# Patient Record
Sex: FEMALE | Race: WHITE | Employment: FULL TIME | URBAN - METROPOLITAN AREA
[De-identification: names, ages, dates, MRNs, and addresses within clinical notes are randomized per-mention and may not be internally consistent; named-entity substitution may affect disease eponyms.]

---

## 2018-10-05 ENCOUNTER — HOSPITAL ENCOUNTER (OUTPATIENT)
Dept: LAB | Age: 58
Discharge: HOME | End: 2018-10-05

## 2018-10-05 PROCEDURE — 88312 SPECIAL STAINS GROUP 1: CPT

## 2018-10-05 PROCEDURE — 88305 TISSUE EXAM BY PATHOLOGIST: CPT

## 2018-10-18 ENCOUNTER — HOSPITAL ENCOUNTER (OUTPATIENT)
Dept: LAB | Age: 58
Discharge: HOME | End: 2018-10-18

## 2018-10-18 PROCEDURE — 88305 TISSUE EXAM BY PATHOLOGIST: CPT

## 2018-10-18 PROCEDURE — 88312 SPECIAL STAINS GROUP 1: CPT

## 2018-11-12 ENCOUNTER — HOSPITAL ENCOUNTER (INPATIENT)
Age: 58
LOS: 3 days | Discharge: HOME | DRG: 392 | End: 2018-11-15
Attending: SURGERY | Admitting: SURGERY
Payer: COMMERCIAL

## 2018-11-12 ENCOUNTER — HOSPITAL ENCOUNTER (OUTPATIENT)
Dept: CT IMAGING | Age: 58
Discharge: HOME | DRG: 392 | End: 2018-11-12
Attending: INTERNAL MEDICINE
Payer: COMMERCIAL

## 2018-11-12 DIAGNOSIS — K57.80 DIVERTICULITIS OF INTESTINE WITH ABSCESS WITHOUT BLEEDING, UNSPECIFIED PART OF INTESTINAL TRACT: Primary | ICD-10-CM

## 2018-11-12 DIAGNOSIS — K57.90 DIVERTICULOSIS OF INTESTINE WITHOUT BLEEDING, UNSPECIFIED INTESTINAL TRACT LOCATION: ICD-10-CM

## 2018-11-12 DIAGNOSIS — R10.30 LOWER ABDOMINAL PAIN: ICD-10-CM

## 2018-11-12 LAB
ABO + RH BLD: NORMAL
ALBUMIN SERPL-MCNC: 2.8 G/DL (ref 3.5–5)
ALBUMIN/GLOB SERPL: 0.5 {RATIO} (ref 1.2–3.5)
ALP SERPL-CCNC: 141 U/L (ref 50–136)
ALT SERPL-CCNC: 24 U/L (ref 12–65)
ANION GAP SERPL CALC-SCNC: 9 MMOL/L (ref 7–16)
AST SERPL-CCNC: 19 U/L (ref 15–37)
BASOPHILS # BLD: 0.1 K/UL (ref 0–0.2)
BASOPHILS NFR BLD: 0 % (ref 0–2)
BILIRUB SERPL-MCNC: 0.3 MG/DL (ref 0.2–1.1)
BLOOD GROUP ANTIBODIES SERPL: NORMAL
BUN SERPL-MCNC: 11 MG/DL (ref 6–23)
CALCIUM SERPL-MCNC: 9.3 MG/DL (ref 8.3–10.4)
CHLORIDE SERPL-SCNC: 100 MMOL/L (ref 98–107)
CO2 SERPL-SCNC: 26 MMOL/L (ref 21–32)
CREAT SERPL-MCNC: 0.98 MG/DL (ref 0.6–1)
DIFFERENTIAL METHOD BLD: ABNORMAL
EOSINOPHIL # BLD: 0.2 K/UL (ref 0–0.8)
EOSINOPHIL NFR BLD: 1 % (ref 0.5–7.8)
ERYTHROCYTE [DISTWIDTH] IN BLOOD BY AUTOMATED COUNT: 12.7 %
GLOBULIN SER CALC-MCNC: 5.5 G/DL (ref 2.3–3.5)
GLUCOSE SERPL-MCNC: 94 MG/DL (ref 65–100)
HCT VFR BLD AUTO: 36.6 % (ref 35.8–46.3)
HGB BLD-MCNC: 11.7 G/DL (ref 11.7–15.4)
IMM GRANULOCYTES # BLD: 0.1 K/UL (ref 0–0.5)
IMM GRANULOCYTES NFR BLD AUTO: 1 % (ref 0–5)
LYMPHOCYTES # BLD: 2.4 K/UL (ref 0.5–4.6)
LYMPHOCYTES NFR BLD: 16 % (ref 13–44)
MCH RBC QN AUTO: 25.7 PG (ref 26.1–32.9)
MCHC RBC AUTO-ENTMCNC: 32 G/DL (ref 31.4–35)
MCV RBC AUTO: 80.3 FL (ref 79.6–97.8)
MONOCYTES # BLD: 1.2 K/UL (ref 0.1–1.3)
MONOCYTES NFR BLD: 8 % (ref 4–12)
NEUTS SEG # BLD: 10.9 K/UL (ref 1.7–8.2)
NEUTS SEG NFR BLD: 74 % (ref 43–78)
NRBC # BLD: 0 K/UL (ref 0–0.2)
PLATELET # BLD AUTO: 472 K/UL (ref 150–450)
PMV BLD AUTO: 9.8 FL (ref 9.4–12.3)
POTASSIUM SERPL-SCNC: 4 MMOL/L (ref 3.5–5.1)
PROT SERPL-MCNC: 8.3 G/DL (ref 6.3–8.2)
RBC # BLD AUTO: 4.56 M/UL (ref 4.05–5.2)
SODIUM SERPL-SCNC: 135 MMOL/L (ref 136–145)
SPECIMEN EXP DATE BLD: NORMAL
WBC # BLD AUTO: 14.7 K/UL (ref 4.3–11.1)

## 2018-11-12 PROCEDURE — 87153 DNA/RNA SEQUENCING: CPT

## 2018-11-12 PROCEDURE — 74011000258 HC RX REV CODE- 258: Performed by: SURGERY

## 2018-11-12 PROCEDURE — 77030032490 HC SLV COMPR SCD KNE COVD -B

## 2018-11-12 PROCEDURE — 86900 BLOOD TYPING SEROLOGIC ABO: CPT

## 2018-11-12 PROCEDURE — 74011250636 HC RX REV CODE- 250/636: Performed by: SURGERY

## 2018-11-12 PROCEDURE — 85025 COMPLETE CBC W/AUTO DIFF WBC: CPT

## 2018-11-12 PROCEDURE — 36415 COLL VENOUS BLD VENIPUNCTURE: CPT

## 2018-11-12 PROCEDURE — 87076 CULTURE ANAEROBE IDENT EACH: CPT

## 2018-11-12 PROCEDURE — 74011636320 HC RX REV CODE- 636/320: Performed by: INTERNAL MEDICINE

## 2018-11-12 PROCEDURE — 75810000275 HC EMERGENCY DEPT VISIT NO LEVEL OF CARE: Performed by: EMERGENCY MEDICINE

## 2018-11-12 PROCEDURE — 80053 COMPREHEN METABOLIC PANEL: CPT

## 2018-11-12 PROCEDURE — 65270000029 HC RM PRIVATE

## 2018-11-12 PROCEDURE — 77030020257 HC SOL INJ SOD CL 0.45% 1000ML BG

## 2018-11-12 PROCEDURE — 74011000258 HC RX REV CODE- 258: Performed by: INTERNAL MEDICINE

## 2018-11-12 PROCEDURE — 74177 CT ABD & PELVIS W/CONTRAST: CPT

## 2018-11-12 RX ORDER — POTASSIUM CHLORIDE AND SODIUM CHLORIDE 450; 150 MG/100ML; MG/100ML
INJECTION, SOLUTION INTRAVENOUS CONTINUOUS
Status: DISCONTINUED | OUTPATIENT
Start: 2018-11-12 | End: 2018-11-14

## 2018-11-12 RX ORDER — HYDROMORPHONE HYDROCHLORIDE 1 MG/ML
0.5 INJECTION, SOLUTION INTRAMUSCULAR; INTRAVENOUS; SUBCUTANEOUS
Status: DISCONTINUED | OUTPATIENT
Start: 2018-11-12 | End: 2018-11-15 | Stop reason: HOSPADM

## 2018-11-12 RX ORDER — ONDANSETRON 2 MG/ML
4 INJECTION INTRAMUSCULAR; INTRAVENOUS
Status: DISCONTINUED | OUTPATIENT
Start: 2018-11-12 | End: 2018-11-15 | Stop reason: HOSPADM

## 2018-11-12 RX ORDER — SODIUM CHLORIDE 0.9 % (FLUSH) 0.9 %
10 SYRINGE (ML) INJECTION
Status: COMPLETED | OUTPATIENT
Start: 2018-11-12 | End: 2018-11-12

## 2018-11-12 RX ADMIN — PIPERACILLIN SODIUM,TAZOBACTAM SODIUM 3.38 G: 3; .375 INJECTION, POWDER, FOR SOLUTION INTRAVENOUS at 23:00

## 2018-11-12 RX ADMIN — IOPAMIDOL 100 ML: 755 INJECTION, SOLUTION INTRAVENOUS at 14:49

## 2018-11-12 RX ADMIN — SODIUM CHLORIDE 100 ML: 900 INJECTION, SOLUTION INTRAVENOUS at 14:49

## 2018-11-12 RX ADMIN — DIATRIZOATE MEGLUMINE AND DIATRIZOATE SODIUM 15 ML: 660; 100 LIQUID ORAL; RECTAL at 14:49

## 2018-11-12 RX ADMIN — SODIUM CHLORIDE AND POTASSIUM CHLORIDE: 4.5; 1.49 INJECTION, SOLUTION INTRAVENOUS at 23:00

## 2018-11-12 RX ADMIN — Medication 10 ML: at 14:49

## 2018-11-12 NOTE — ED TRIAGE NOTES
Pt sent here for abnormal CT scan, pericolonic abscess. gen surgery paged. Continual abd pain for week

## 2018-11-12 NOTE — PROGRESS NOTES
Just spoke to [facility] and determined that patient is being sent to ER. This is what I would have recommended as well.

## 2018-11-12 NOTE — H&P
[facility] [address], [address] [facility] 
[phone] History and Physical/Surgical Consult Eli Vaughan    Admit date: 2018 MRN: [id number]     : 1960     Age: 60 y.o.       
 
2018 5:15 PM 
 
Subjective/HPI:  
This patient is a 60 y.o. sent to ED by her PCP. Pt reports about one week of worsening abdominal pain, mainly in LLQ. She had a CT scan today that is below. C/w perforated diverticulitis and contained abscess. Pt has had diverticulitis in past requiring abx. Normal colonoscopy by Dr Lori Reyes recently Denies fevers or chills. Has nausea but no  Vomiting. Has been constipated over last week. No bleeding Review of Systems A comprehensive review of systems was negative except for: Constitutional: positive for fatigue, malaise and anorexia Gastrointestinal: positive for nausea, constipation and abdominal pain Past Medical History:  
Diagnosis Date  Diaphragmatic hernia without mention of obstruction or gangrene 2015  Diverticulosis  Effusion of ankle and foot joint  Encounter for long-term (current) use of other medications  Helminth infection, unspecified  Morbid obesity ([address] 2015  Nontoxic uninodular goiter 2015  Other specified erythematous condition(695.89)  Overweight(278.02) Past Surgical History:  
Procedure Laterality Date 1 [address] No Known Allergies Social History Tobacco Use  Smoking status: Never Smoker  Smokeless tobacco: Never Used Substance Use Topics  Alcohol use: Yes Comment: rarely Social History Social History Narrative  Not on file Family History Problem Relation Age of Onset  Cancer Mother  Heart Disease Mother  Stroke Father  Diabetes Father  Cancer Brother  Cancer Maternal Grandmother   
     colon cancer Prior to Admission Medications Prescriptions Last Dose Informant Patient Reported? Taking?  
amoxicillin-clavulanate (AUGMENTIN) 875-125 mg per tablet   No No  
Sig: Take 1 Tab by mouth every twelve (12) hours. ibuprofen 200 mg cap   Yes No  
Sig: Take  by mouth as needed. raNITIdine (ZANTAC) 150 mg tablet   No No  
Sig: Take 1 Tab by mouth two (2) times a day. Facility-Administered Medications: None No current facility-administered medications for this encounter. Current Outpatient Medications Medication Sig  
 amoxicillin-clavulanate (AUGMENTIN) 875-125 mg per tablet Take 1 Tab by mouth every twelve (12) hours.  raNITIdine (ZANTAC) 150 mg tablet Take 1 Tab by mouth two (2) times a day.  ibuprofen 200 mg cap Take  by mouth as needed. Objective:  
 
Vitals:  
 11/12/18 1559 BP: 138/89 Pulse: (!) 116 Resp: 18 Temp: 98 °F (36.7 °C) SpO2: 98% Weight: 178 lb (80.7 kg) Height: 4' 10\" (1.473 m) No intake/output data recorded. No intake/output data recorded. Physical Exam:  
Gen- the patient is well developed and in no acute distress HEENT- PERRL, EOMI, no scleral icterus 
     nose without alar flaring or epistaxis 
                oral muscosa moist without cyanosis Neck- no JVD or retractions Lungs- resp even/unlab Heart- RRR Abd- soft, nondistended. Tender to palpation in LLQ with voluntary guarding. No rebound. No mass or hernia found Ext- warm without cyanosis. There is no lower leg edema. Skin- no jaundice or rashes Neuro- alert and oriented x 3. No gross sensorimotor deficits are present. Data Review Recent Labs 11/12/18 
1605 WBC 14.7* HGB 11.7 HCT 36.6 * Recent Labs 11/12/18 
1605 * K 4.0  
 CO2 26 GLU 94 BUN 11  
CREA 0.98 Assessment:  
 
Hospital Problems  Date Reviewed: 11/9/2018 Codes Class Noted POA  Diverticulitis of intestine with abscess without bleeding ICD-10-CM: K57.80 ICD-9-CM: 562.11, 569.5  11/12/2018 Unknown Plan:  
 
Sigmoid diverticulitis with contained perforation and abscess Pt admitted for 1. IV abx 2. Consult IR in am for consideration of Ct guided drainage of abscess 3. NPO after midnight Marletta Goodpasture, MD

## 2018-11-13 ENCOUNTER — APPOINTMENT (OUTPATIENT)
Dept: CT IMAGING | Age: 58
DRG: 392 | End: 2018-11-13
Attending: SURGERY
Payer: COMMERCIAL

## 2018-11-13 LAB
ANION GAP SERPL CALC-SCNC: 7 MMOL/L (ref 7–16)
BASOPHILS # BLD: 0.1 K/UL (ref 0–0.2)
BASOPHILS NFR BLD: 1 % (ref 0–2)
BUN SERPL-MCNC: 10 MG/DL (ref 6–23)
CALCIUM SERPL-MCNC: 9 MG/DL (ref 8.3–10.4)
CHLORIDE SERPL-SCNC: 102 MMOL/L (ref 98–107)
CO2 SERPL-SCNC: 28 MMOL/L (ref 21–32)
CREAT SERPL-MCNC: 0.98 MG/DL (ref 0.6–1)
DIFFERENTIAL METHOD BLD: ABNORMAL
EOSINOPHIL # BLD: 0.3 K/UL (ref 0–0.8)
EOSINOPHIL NFR BLD: 3 % (ref 0.5–7.8)
ERYTHROCYTE [DISTWIDTH] IN BLOOD BY AUTOMATED COUNT: 12.6 %
GLUCOSE SERPL-MCNC: 88 MG/DL (ref 65–100)
HCT VFR BLD AUTO: 35 % (ref 35.8–46.3)
HGB BLD-MCNC: 11.1 G/DL (ref 11.7–15.4)
IMM GRANULOCYTES # BLD: 0.1 K/UL (ref 0–0.5)
IMM GRANULOCYTES NFR BLD AUTO: 1 % (ref 0–5)
LYMPHOCYTES # BLD: 2.3 K/UL (ref 0.5–4.6)
LYMPHOCYTES NFR BLD: 23 % (ref 13–44)
MCH RBC QN AUTO: 25.6 PG (ref 26.1–32.9)
MCHC RBC AUTO-ENTMCNC: 31.7 G/DL (ref 31.4–35)
MCV RBC AUTO: 80.8 FL (ref 79.6–97.8)
MONOCYTES # BLD: 0.8 K/UL (ref 0.1–1.3)
MONOCYTES NFR BLD: 9 % (ref 4–12)
NEUTS SEG # BLD: 6.4 K/UL (ref 1.7–8.2)
NEUTS SEG NFR BLD: 65 % (ref 43–78)
NRBC # BLD: 0 K/UL (ref 0–0.2)
PLATELET # BLD AUTO: 441 K/UL (ref 150–450)
PMV BLD AUTO: 9.8 FL (ref 9.4–12.3)
POTASSIUM SERPL-SCNC: 4.2 MMOL/L (ref 3.5–5.1)
RBC # BLD AUTO: 4.33 M/UL (ref 4.05–5.2)
SODIUM SERPL-SCNC: 137 MMOL/L (ref 136–145)
WBC # BLD AUTO: 9.9 K/UL (ref 4.3–11.1)

## 2018-11-13 PROCEDURE — 74011250636 HC RX REV CODE- 250/636: Performed by: RADIOLOGY

## 2018-11-13 PROCEDURE — 85025 COMPLETE CBC W/AUTO DIFF WBC: CPT

## 2018-11-13 PROCEDURE — 74011000250 HC RX REV CODE- 250: Performed by: RADIOLOGY

## 2018-11-13 PROCEDURE — 87075 CULTR BACTERIA EXCEPT BLOOD: CPT

## 2018-11-13 PROCEDURE — 87205 SMEAR GRAM STAIN: CPT

## 2018-11-13 PROCEDURE — 65270000029 HC RM PRIVATE

## 2018-11-13 PROCEDURE — C9113 INJ PANTOPRAZOLE SODIUM, VIA: HCPCS | Performed by: SURGERY

## 2018-11-13 PROCEDURE — 87077 CULTURE AEROBIC IDENTIFY: CPT

## 2018-11-13 PROCEDURE — 74011250637 HC RX REV CODE- 250/637: Performed by: SURGERY

## 2018-11-13 PROCEDURE — 99152 MOD SED SAME PHYS/QHP 5/>YRS: CPT

## 2018-11-13 PROCEDURE — 80048 BASIC METABOLIC PNL TOTAL CA: CPT

## 2018-11-13 PROCEDURE — C1729 CATH, DRAINAGE: HCPCS

## 2018-11-13 PROCEDURE — 87186 SC STD MICRODIL/AGAR DIL: CPT

## 2018-11-13 PROCEDURE — 74011000258 HC RX REV CODE- 258: Performed by: SURGERY

## 2018-11-13 PROCEDURE — 74011000250 HC RX REV CODE- 250: Performed by: SURGERY

## 2018-11-13 PROCEDURE — 74011250636 HC RX REV CODE- 250/636

## 2018-11-13 PROCEDURE — 36415 COLL VENOUS BLD VENIPUNCTURE: CPT

## 2018-11-13 PROCEDURE — 74011250636 HC RX REV CODE- 250/636: Performed by: SURGERY

## 2018-11-13 PROCEDURE — 77030020257 HC SOL INJ SOD CL 0.45% 1000ML BG

## 2018-11-13 PROCEDURE — 99153 MOD SED SAME PHYS/QHP EA: CPT

## 2018-11-13 PROCEDURE — 0W9J30Z DRAINAGE OF PELVIC CAVITY WITH DRAINAGE DEVICE, PERCUTANEOUS APPROACH: ICD-10-PCS | Performed by: RADIOLOGY

## 2018-11-13 RX ORDER — LIDOCAINE HYDROCHLORIDE 10 MG/ML
1-20 INJECTION, SOLUTION EPIDURAL; INFILTRATION; INTRACAUDAL; PERINEURAL
Status: DISCONTINUED | OUTPATIENT
Start: 2018-11-13 | End: 2018-11-13 | Stop reason: ALTCHOICE

## 2018-11-13 RX ORDER — FENTANYL CITRATE 50 UG/ML
25-100 INJECTION, SOLUTION INTRAMUSCULAR; INTRAVENOUS
Status: DISCONTINUED | OUTPATIENT
Start: 2018-11-13 | End: 2018-11-13 | Stop reason: ALTCHOICE

## 2018-11-13 RX ORDER — SODIUM CHLORIDE 9 MG/ML
25 INJECTION, SOLUTION INTRAVENOUS ONCE
Status: COMPLETED | OUTPATIENT
Start: 2018-11-13 | End: 2018-11-13

## 2018-11-13 RX ORDER — ACETAMINOPHEN 325 MG/1
325 TABLET ORAL
Status: DISCONTINUED | OUTPATIENT
Start: 2018-11-13 | End: 2018-11-14

## 2018-11-13 RX ORDER — HYDROCODONE BITARTRATE AND ACETAMINOPHEN 5; 325 MG/1; MG/1
1 TABLET ORAL
Status: DISCONTINUED | OUTPATIENT
Start: 2018-11-13 | End: 2018-11-15 | Stop reason: HOSPADM

## 2018-11-13 RX ORDER — MIDAZOLAM HYDROCHLORIDE 1 MG/ML
.25-2 INJECTION, SOLUTION INTRAMUSCULAR; INTRAVENOUS
Status: DISCONTINUED | OUTPATIENT
Start: 2018-11-13 | End: 2018-11-13 | Stop reason: ALTCHOICE

## 2018-11-13 RX ADMIN — SODIUM CHLORIDE 25 ML/HR: 900 INJECTION, SOLUTION INTRAVENOUS at 13:20

## 2018-11-13 RX ADMIN — ACETAMINOPHEN 325 MG: 325 TABLET, FILM COATED ORAL at 16:17

## 2018-11-13 RX ADMIN — FENTANYL CITRATE 25 MCG: 50 INJECTION, SOLUTION INTRAMUSCULAR; INTRAVENOUS at 13:56

## 2018-11-13 RX ADMIN — PROMETHAZINE HYDROCHLORIDE 12.5 MG: 25 INJECTION INTRAMUSCULAR; INTRAVENOUS at 13:09

## 2018-11-13 RX ADMIN — MIDAZOLAM HYDROCHLORIDE 0.5 MG: 1 INJECTION, SOLUTION INTRAMUSCULAR; INTRAVENOUS at 13:56

## 2018-11-13 RX ADMIN — MIDAZOLAM HYDROCHLORIDE 0.5 MG: 1 INJECTION, SOLUTION INTRAMUSCULAR; INTRAVENOUS at 13:47

## 2018-11-13 RX ADMIN — MIDAZOLAM HYDROCHLORIDE 0.5 MG: 1 INJECTION, SOLUTION INTRAMUSCULAR; INTRAVENOUS at 13:33

## 2018-11-13 RX ADMIN — SODIUM CHLORIDE 40 MG: 9 INJECTION, SOLUTION INTRAMUSCULAR; INTRAVENOUS; SUBCUTANEOUS at 08:21

## 2018-11-13 RX ADMIN — Medication 8 ML: at 13:48

## 2018-11-13 RX ADMIN — MIDAZOLAM HYDROCHLORIDE 0.5 MG: 1 INJECTION, SOLUTION INTRAMUSCULAR; INTRAVENOUS at 13:41

## 2018-11-13 RX ADMIN — FENTANYL CITRATE 25 MCG: 50 INJECTION, SOLUTION INTRAMUSCULAR; INTRAVENOUS at 13:41

## 2018-11-13 RX ADMIN — PIPERACILLIN SODIUM,TAZOBACTAM SODIUM 3.38 G: 3; .375 INJECTION, POWDER, FOR SOLUTION INTRAVENOUS at 21:00

## 2018-11-13 RX ADMIN — SODIUM CHLORIDE AND POTASSIUM CHLORIDE: 4.5; 1.49 INJECTION, SOLUTION INTRAVENOUS at 20:59

## 2018-11-13 RX ADMIN — FENTANYL CITRATE 25 MCG: 50 INJECTION, SOLUTION INTRAMUSCULAR; INTRAVENOUS at 13:33

## 2018-11-13 RX ADMIN — PIPERACILLIN SODIUM,TAZOBACTAM SODIUM 3.38 G: 3; .375 INJECTION, POWDER, FOR SOLUTION INTRAVENOUS at 05:31

## 2018-11-13 RX ADMIN — PIPERACILLIN SODIUM,TAZOBACTAM SODIUM 3.38 G: 3; .375 INJECTION, POWDER, FOR SOLUTION INTRAVENOUS at 16:09

## 2018-11-13 RX ADMIN — FENTANYL CITRATE 25 MCG: 50 INJECTION, SOLUTION INTRAMUSCULAR; INTRAVENOUS at 13:47

## 2018-11-13 NOTE — PROGRESS NOTES
Hourly rounding completed on this shift. No new concerns voiced at this time. Pt currently sitting in bedside chair watching tv and reading a magazine. Will continue to monitor and give report to oncoming nurse.

## 2018-11-13 NOTE — PROGRESS NOTES
This nurse flushed pt's abscess drain with 8 cc of NS. Pt uncomfortable during flush and only able to tolerate 8cc. Drain bulb charged, serosanguinous drainage flowing through catheter.

## 2018-11-13 NOTE — PROGRESS NOTES
[address] [facility] [address] [facility] 
[phone] Progress Note Pedro Liz    Admit date: 2018 MRN: [id number]     : 1960     Age: 60 y.o.       
 
2018 5:15 PM 
 
Subjective/HPI:  
This patient is a 60 y.o. sent to ED by her PCP. Pt reports about one week of worsening abdominal pain, mainly in LLQ. She had a CT scan today that is below. C/w perforated diverticulitis and contained abscess. Pt has had diverticulitis in past requiring abx. Normal colonoscopy by Dr Aleks Pollard recently Denies fevers or chills. Has nausea but no  Vomiting. Has been constipated over last week. No bleeding 18: Up in chair, no new complaints. AF, tachy overnight. Labs stable. WBC down to 9.9 from 14.7. Review of Systems A comprehensive review of systems was negative except for: Constitutional: positive for fatigue, malaise and anorexia Gastrointestinal: positive for nausea, constipation and abdominal pain Past Medical History:  
Diagnosis Date  Diaphragmatic hernia without mention of obstruction or gangrene 2015  Diverticulosis  Effusion of ankle and foot joint  Encounter for long-term (current) use of other medications  Helminth infection, unspecified  Morbid obesity ([address] 2015  Nontoxic uninodular goiter 2015  Other specified erythematous condition(695.89)  Overweight(278.02) Past Surgical History:  
Procedure Laterality Date 1 [facility] [address] [facility] No Known Allergies Social History Tobacco Use  Smoking status: Never Smoker  Smokeless tobacco: Never Used Substance Use Topics  Alcohol use: Yes Comment: rarely Social History Social History Narrative  Not on file Family History Problem Relation Age of Onset  Cancer Mother  Heart Disease Mother  Stroke Father  Diabetes Father  Cancer Brother  Cancer Maternal Grandmother   
     colon cancer Prior to Admission Medications Prescriptions Last Dose Informant Patient Reported? Taking?  
amoxicillin-clavulanate (AUGMENTIN) 875-125 mg per tablet   No No  
Sig: Take 1 Tab by mouth every twelve (12) hours. ibuprofen 200 mg cap   Yes No  
Sig: Take  by mouth as needed. raNITIdine (ZANTAC) 150 mg tablet   No No  
Sig: Take 1 Tab by mouth two (2) times a day. Facility-Administered Medications: None Current Facility-Administered Medications Medication Dose Route Frequency  0.45% sodium chloride with KCl 20 mEq/L infusion   IntraVENous CONTINUOUS  
 pantoprazole (PROTONIX) 40 mg in sodium chloride 0.9% 10 mL injection  40 mg IntraVENous DAILY  HYDROmorphone (PF) (DILAUDID) injection 0.5 mg  0.5 mg IntraVENous Q3H PRN  
 ondansetron (ZOFRAN) injection 4 mg  4 mg IntraVENous Q4H PRN  piperacillin-tazobactam (ZOSYN) 3.375 g in 0.9% sodium chloride (MBP/ADV) 100 mL  3.375 g IntraVENous Q8H Objective:  
 
Vitals:  
 11/12/18 2122 11/12/18 2341 11/13/18 0539 11/13/18 0745 BP: 133/89 137/83 114/75 111/69 Pulse: (!) 107 98 81 80 Resp: 18 18 18 15 Temp: 97.6 °F (36.4 °C) 97.9 °F (36.6 °C) 97.8 °F (36.6 °C) 97.5 °F (36.4 °C) SpO2: 94% 98% 98% 97% Weight:      
Height:      
 
No intake/output data recorded. 11/11 1901 - 11/13 0700 In: 428 [I.V.:428] Out: - Physical Exam:  
Gen- the patient is well developed and in no acute distress HEENT- PERRL, EOMI, no scleral icterus 
     nose without alar flaring or epistaxis 
                oral muscosa moist without cyanosis Neck- no JVD or retractions Lungs- resp even/unlab Heart- RRR Abd- soft, nondistended. Tender to palpation in LLQ with voluntary guarding. No rebound. No mass or hernia found Ext- warm without cyanosis. There is no lower leg edema. Skin- no jaundice or rashes Neuro- alert and oriented x 3. No gross sensorimotor deficits are present. Data Review Recent Labs 11/13/18 
[id number] 11/12/18 
1605 WBC 9.9 14.7* HGB 11.1* 11.7 HCT 35.0* 36.6  472* Recent Labs 11/13/18 
[id number] 11/12/18 
1605  135* K 4.2 4.0  
 100 CO2 28 26 GLU 88 94 BUN 10 11 CREA 0.98 0.98 Assessment:  
 
Hospital Problems  Date Reviewed: 11/9/2018 Codes Class Noted POA * (Principal) Diverticulitis of intestine with abscess without bleeding ICD-10-CM: K57.80 ICD-9-CM: 562.11, 569.5  11/12/2018 Yes Plan:  
 
Continue present care Continue IV ABX Await IR drainage of diverticular abscess NPO 
IVFs Labs in AM 
Transfer to 2nd floor Alo Montano NP

## 2018-11-13 NOTE — PROGRESS NOTES
Pt's daughter requesting pt be moved to 2nd floor surgical now. Orders placed this morning for transfer. No beds have been available this shift so far. Charge nurse on 2nd floor called to see if any beds were available later. House supervisor Matt Patron called to see if pt can be placed on higher priority to one of the 2nd floor rooms once it's cleaned. House supervisor unable to complete this request at this time due to possibility of fresh post-op patients needing the rooms. Unable to complete transfer at this time.

## 2018-11-13 NOTE — PROGRESS NOTES
TRANSFER - IN REPORT: 
 
Verbal report received from HERMILA Mohr on Cait Rossi  being received from IR for routine progression of care Report consisted of patients Situation, Background, Assessment and  
Recommendations(SBAR). Information from the following report(s) Procedure Summary and MAR was reviewed with the receiving nurse. Assessment completed upon patients arrival to unit and care assumed.

## 2018-11-13 NOTE — PROGRESS NOTES
TRANSFER - IN REPORT: 
 
Verbal report received from Carole Bean on Claudean Scriver  being received from ED for routine progression of care Report consisted of patients Situation, Background, Assessment and  
Recommendations(SBAR). Information from the following report(s) SBAR, Kardex, ED Summary and MAR was reviewed with the receiving nurse. Opportunity for questions and clarification was provided. Assessment completed upon patients arrival to unit and care assumed.

## 2018-11-13 NOTE — PROGRESS NOTES
Pt c/o pain in back and abd. Pt states she feels constipated. Last BM on 11/10 and pt states \"it wasn't very much. \" pt requests tylenol PO for pain due to pt's concern with overmedication of narcotics. Pt has dilaudid 0.5mg IVP ordered. This nurse spoke with [facility] surgery NP and pt is not to have anything PO. This nurse explained this to pt. Offered dilaudid. Pt declines at this time. IR to send transport for procedure around lunchtime. Pt and daughter informed.

## 2018-11-13 NOTE — PROCEDURES
Department of Interventional Radiology  [phone]        Interventional Radiology Brief Procedure Note    Patient: Cait Rossi MRN: [id number]  SSN: [id number]    YOB: 1960  Age: 60 y.o. Sex: female      Date of Procedure: 11/13/2018    Pre-Procedure Diagnosis: pelvic abscess    Post-Procedure Diagnosis: SAME    Procedure(s): Image Guided Drain Placement    Brief Description of Procedure: CT guided drain placement    Performed By: Devan Lopez MD     Assistants: None    Anesthesia:Moderate Sedation    Estimated Blood Loss: Less than 10ml    Specimens:  Microbiology    Implants: All Purpose Drain    Findings: pelvic collection, bloody fluid obtained    Complications: None    Recommendations: external drainage.  return in 7-10 days for tube check     Follow Up: as above    Signed By: Devan Lopez MD     November 13, 2018

## 2018-11-13 NOTE — PROGRESS NOTES
TRANSFER - OUT REPORT: 
 
Verbal report given to 8001 Indy Pearson RN (name) on Minda Dinh  being transferred to IR recovery (unit) for routine progression of care Report consisted of patients Situation, Background, Assessment and  
Recommendations(SBAR). Information from the following report(s) Procedure Summary and MAR was reviewed with the receiving nurse. Opportunity for questions and clarification was provided. Conscious Sedation:  
100 Mcg of Fentanyl administered 2 Mg of Versed administered Pt tolerated procedure well. Visit Vitals /70 Pulse 93 Temp 98.6 °F (37 °C) Resp 16 Ht 4' 10\" (1.473 m) Wt 80.7 kg (178 lb) SpO2 96% BMI 37.20 kg/m² Past Medical History:  
Diagnosis Date  Diaphragmatic hernia without mention of obstruction or gangrene 6/2/2015  Diverticulosis  Effusion of ankle and foot joint  Encounter for long-term (current) use of other medications  Helminth infection, unspecified  Morbid obesity ([facility] [address] 6/2/2015  Nontoxic uninodular goiter 6/2/2015  Other specified erythematous condition(695.89)  Overweight(278.02) Peripheral IV 11/12/18 Right Antecubital (Active) Site Assessment Clean, dry, & intact 11/13/2018 12:20 PM  
Phlebitis Assessment 0 11/13/2018 12:20 PM  
Infiltration Assessment 0 11/13/2018 12:20 PM  
Dressing Status Clean, dry, & intact 11/13/2018 12:20 PM  
Dressing Type Tape;Transparent 11/13/2018 12:20 PM  
Hub Color/Line Status Infusing 11/13/2018 12:20 PM  
Alcohol Cap Used No 11/12/2018  9:26 PM  
 
  
  
  
  
Drain Pelvic Abscess Drain 11/13/18 Left; Lower Abdomen (Active)

## 2018-11-13 NOTE — ROUTINE PROCESS
TRANSFER - OUT REPORT: 
 
Verbal report given to mehdi nunez(name) on Juhi Covarrubias  being transferred to 606(unit) for routine progression of care Report consisted of patients Situation, Background, Assessment and  
Recommendations(SBAR). Information from the following report(s) SBAR was reviewed with the receiving nurse. Lines:  
Peripheral IV 11/12/18 Right Antecubital (Active) Site Assessment Clean, dry, & intact 11/12/2018  4:43 PM  
Phlebitis Assessment 0 11/12/2018  4:43 PM  
Infiltration Assessment 0 11/12/2018  4:43 PM  
Dressing Status Clean, dry, & intact 11/12/2018  4:43 PM  
  
 
Opportunity for questions and clarification was provided. Patient transported with: 
 [employer]

## 2018-11-13 NOTE — PROGRESS NOTES
Chart screened by  for discharge planning. No needs identified at this time. Please consult  if any new issues arise. Care Management Interventions PCP Verified by CM: Yes Transition of Care Consult (CM Consult): Discharge Planning Current Support Network: Own Home Confirm Follow Up Transport: Family Plan discussed with Pt/Family/Caregiver: Yes Freedom of Choice Offered: Yes Discharge Location Discharge Placement: Home

## 2018-11-13 NOTE — PROGRESS NOTES
responded to request to help with HCPOA.  spoke with pt's Rn who stated that pt was alert and oriented x4 and capable of doing her HCPOA.  arrived at room and pt had been taken to IR earlier in the day and hadn't returned.  returned to assist later and pt was in her room with her daughter visiting.  spoke with pt regarding her HCPOA and pt stated that she had answered a question stating that she did not have one but was interested in learning about it.  educated pt and daughter over the process and provided the HCPOA form for them to read and learn about the document.  stated that if they wanted a  to come back by tomorrow to assist in completing the form to contact us and we would be happy to help.  welcomed them to DT and shared information about spiritual care.

## 2018-11-13 NOTE — PROGRESS NOTES
TRANSFER - OUT REPORT: 
 
Verbal report given to HERMILA Santacruz (name) on [place]  being transferred to 6th floor (unit) for routine progression of care Report consisted of patients Situation, Background, Assessment and  
Recommendations(SBAR). Information from the following report(s) Procedure Summary and MAR was reviewed with the receiving nurse. Opportunity for questions and clarification was provided. Conscious Sedation:  
100 Mcg of Fentanyl administered 2 Mg of Versed administered Pt tolerated procedure well. Visit Vitals /70 Pulse 93 Temp 98.6 °F (37 °C) Resp 16 Ht 4' 10\" (1.473 m) Wt 80.7 kg (178 lb) SpO2 96% BMI 37.20 kg/m² Past Medical History:  
Diagnosis Date  Diaphragmatic hernia without mention of obstruction or gangrene 6/2/2015  Diverticulosis  Effusion of ankle and foot joint  Encounter for long-term (current) use of other medications  Helminth infection, unspecified  Morbid obesity ([facility] [address] 6/2/2015  Nontoxic uninodular goiter 6/2/2015  Other specified erythematous condition(695.89)  Overweight(278.02) Peripheral IV 11/12/18 Right Antecubital (Active) Site Assessment Clean, dry, & intact 11/13/2018 12:20 PM  
Phlebitis Assessment 0 11/13/2018 12:20 PM  
Infiltration Assessment 0 11/13/2018 12:20 PM  
Dressing Status Clean, dry, & intact 11/13/2018 12:20 PM  
Dressing Type Tape;Transparent 11/13/2018 12:20 PM  
Hub Color/Line Status Infusing 11/13/2018 12:20 PM  
Alcohol Cap Used No 11/12/2018  9:26 PM  
 
  
  
  
  
Drain Pelvic Abscess Drain 11/13/18 Left; Lower Abdomen (Active)

## 2018-11-13 NOTE — PROGRESS NOTES
11/12/18 2102 Dual Skin Pressure Injury Assessment Dual Skin Pressure Injury Assessment WDL Second Care Provider (Based on [address] [place] [address]) Farhan Nova RN  
 
Pt arrived to unit via w/c. Pt alert and oriented x4. Dual skin assessment completed. Skin warm dry and intact. No areas of concern noted.

## 2018-11-13 NOTE — PROGRESS NOTES
Charge nurse from 2nd floor, Kathleen, came up to pt's room to instruct pt's family member how to flush and drain multipurpose drain. Education provided. Charge nurse recommended asking IR if flush is preferred to be flushed above stop cock or below. Will pass on to oncoming nurse.

## 2018-11-14 LAB
ANION GAP SERPL CALC-SCNC: 7 MMOL/L (ref 7–16)
BUN SERPL-MCNC: 10 MG/DL (ref 6–23)
CALCIUM SERPL-MCNC: 8.8 MG/DL (ref 8.3–10.4)
CHLORIDE SERPL-SCNC: 106 MMOL/L (ref 98–107)
CO2 SERPL-SCNC: 25 MMOL/L (ref 21–32)
CREAT SERPL-MCNC: 1.04 MG/DL (ref 0.6–1)
ERYTHROCYTE [DISTWIDTH] IN BLOOD BY AUTOMATED COUNT: 12.8 %
GLUCOSE SERPL-MCNC: 95 MG/DL (ref 65–100)
HCT VFR BLD AUTO: 31.7 % (ref 35.8–46.3)
HGB BLD-MCNC: 10 G/DL (ref 11.7–15.4)
MCH RBC QN AUTO: 25.8 PG (ref 26.1–32.9)
MCHC RBC AUTO-ENTMCNC: 31.5 G/DL (ref 31.4–35)
MCV RBC AUTO: 81.7 FL (ref 79.6–97.8)
NRBC # BLD: 0 K/UL (ref 0–0.2)
PLATELET # BLD AUTO: 436 K/UL (ref 150–450)
PMV BLD AUTO: 9.8 FL (ref 9.4–12.3)
POTASSIUM SERPL-SCNC: 4.3 MMOL/L (ref 3.5–5.1)
RBC # BLD AUTO: 3.88 M/UL (ref 4.05–5.2)
SODIUM SERPL-SCNC: 138 MMOL/L (ref 136–145)
WBC # BLD AUTO: 12.1 K/UL (ref 4.3–11.1)

## 2018-11-14 PROCEDURE — 74011000258 HC RX REV CODE- 258: Performed by: SURGERY

## 2018-11-14 PROCEDURE — 74011250637 HC RX REV CODE- 250/637: Performed by: SURGERY

## 2018-11-14 PROCEDURE — 74011250636 HC RX REV CODE- 250/636: Performed by: SURGERY

## 2018-11-14 PROCEDURE — C9113 INJ PANTOPRAZOLE SODIUM, VIA: HCPCS | Performed by: SURGERY

## 2018-11-14 PROCEDURE — 80048 BASIC METABOLIC PNL TOTAL CA: CPT

## 2018-11-14 PROCEDURE — 36415 COLL VENOUS BLD VENIPUNCTURE: CPT

## 2018-11-14 PROCEDURE — 65270000029 HC RM PRIVATE

## 2018-11-14 PROCEDURE — 74011000250 HC RX REV CODE- 250: Performed by: SURGERY

## 2018-11-14 PROCEDURE — 85027 COMPLETE CBC AUTOMATED: CPT

## 2018-11-14 RX ORDER — PANTOPRAZOLE SODIUM 40 MG/1
40 TABLET, DELAYED RELEASE ORAL
Status: DISCONTINUED | OUTPATIENT
Start: 2018-11-15 | End: 2018-11-15 | Stop reason: HOSPADM

## 2018-11-14 RX ORDER — ACETAMINOPHEN 325 MG/1
650 TABLET ORAL
Status: DISCONTINUED | OUTPATIENT
Start: 2018-11-14 | End: 2018-11-15 | Stop reason: HOSPADM

## 2018-11-14 RX ADMIN — ACETAMINOPHEN 325 MG: 325 TABLET, FILM COATED ORAL at 00:02

## 2018-11-14 RX ADMIN — ACETAMINOPHEN 325 MG: 325 TABLET, FILM COATED ORAL at 12:49

## 2018-11-14 RX ADMIN — SODIUM CHLORIDE 40 MG: 9 INJECTION, SOLUTION INTRAMUSCULAR; INTRAVENOUS; SUBCUTANEOUS at 08:12

## 2018-11-14 RX ADMIN — PIPERACILLIN SODIUM,TAZOBACTAM SODIUM 3.38 G: 3; .375 INJECTION, POWDER, FOR SOLUTION INTRAVENOUS at 13:24

## 2018-11-14 RX ADMIN — PIPERACILLIN SODIUM,TAZOBACTAM SODIUM 3.38 G: 3; .375 INJECTION, POWDER, FOR SOLUTION INTRAVENOUS at 05:34

## 2018-11-14 RX ADMIN — ACETAMINOPHEN 650 MG: 325 TABLET, FILM COATED ORAL at 18:24

## 2018-11-14 RX ADMIN — SODIUM CHLORIDE AND POTASSIUM CHLORIDE: 4.5; 1.49 INJECTION, SOLUTION INTRAVENOUS at 05:50

## 2018-11-14 RX ADMIN — PIPERACILLIN SODIUM,TAZOBACTAM SODIUM 3.38 G: 3; .375 INJECTION, POWDER, FOR SOLUTION INTRAVENOUS at 22:04

## 2018-11-14 RX ADMIN — ACETAMINOPHEN 325 MG: 325 TABLET, FILM COATED ORAL at 13:24

## 2018-11-14 NOTE — PROGRESS NOTES
Bed side report given to oncoming nurse. No new complaints at this time. Hourly rounds completed this shift.

## 2018-11-14 NOTE — PROGRESS NOTES
Department of Interventional Radiology 
[phone] Progress NotePatient: Iram Laura MRN: [id number]  SSN: [id number] YOB: 1960  Age: 60 y.o. Sex: female Subjective: No complaints regarding drain placed 11/13 Objective:  
 
Vitals:  
 11/13/18 2100 11/13/18 2217 11/14/18 0405 11/14/18 5442 BP: 99/58 109/76 99/65 112/72 Pulse: 82 74 63 67 Resp: 18 18 18 18 Temp: 98.4 °F (36.9 °C) 97.6 °F (36.4 °C) 97.9 °F (36.6 °C) 98 °F (36.7 °C) SpO2: 94% 97% 95% 95% Weight:      
Height:      
  
Intake and Output: 
Drain Pelvic Abscess Drain 11/13/18 Left; Lower Abdomen (Active) Site Assessment Clean, dry, & intact 11/14/2018  7:37 AM  
Dressing Status Clean, dry, & intact 11/14/2018  7:37 AM  
Status Patent; Charged;Draining 11/14/2018  7:37 AM  
Drainage Description Sanguinous 11/14/2018  7:37 AM  
 
 
 
 
 
Physical Exam: ABDOMEN: soft, ND with drain site CDI, drain flushes with ease, serosang in MUKESH, outputs noted Lab/Data Review: 
CMP:  
Lab Results Component Value Date/Time  11/14/2018 04:41 AM  
 K 4.3 11/14/2018 04:41 AM  
  11/14/2018 04:41 AM  
 CO2 25 11/14/2018 04:41 AM  
 AGAP 7 11/14/2018 04:41 AM  
 GLU 95 11/14/2018 04:41 AM  
 BUN 10 11/14/2018 04:41 AM  
 CREA 1.04 (H) 11/14/2018 04:41 AM  
 GFRAA >60 11/14/2018 04:41 AM  
 GFRNA 58 (L) 11/14/2018 04:41 AM  
 CA 8.8 11/14/2018 04:41 AM  
 
   
CBC W/O DIFF Collection Time: 11/14/18  4:41 AM  
Result Value Ref Range WBC 12.1 (H) 4.3 - 11.1 K/uL  
 RBC 3.88 (L) 4.05 - 5.2 M/uL  
 HGB 10.0 (L) 11.7 - 15.4 g/dL HCT 31.7 (L) 35.8 - 46.3 % MCV 81.7 79.6 - 97.8 FL  
 MCH 25.8 (L) 26.1 - 32.9 PG  
 MCHC 31.5 31.4 - 35.0 g/dL  
 RDW 12.8 % PLATELET 593 085 - 470 K/uL MPV 9.8 9.4 - 12.3 FL ABSOLUTE NRBC 0.00 0.0 - 0.2 K/uL Assessment:  
 
Abscess drain patent Plan:  
Continue to monitor outputs, flush per shift 7-10 days for drain tube check from placement 11/13 Signed By: J Carlos Interiano NP November 14, 2018

## 2018-11-14 NOTE — PROGRESS NOTES
END OF SHIFT NOTE: 
 
INTAKE/OUTPUT 
11/13 0701 - 11/14 0700 In: 8231 [I.V.:1341] Out: -  
Voiding: YES Catheter: NO 
Drain:  
Drain Pelvic Abscess Drain 11/13/18 Left; Lower Abdomen (Active) Site Assessment Clean, dry, & intact 11/13/2018 11:28 PM  
Dressing Status Clean, dry, & intact 11/13/2018 11:28 PM  
Status Charged 11/13/2018 11:28 PM  
Drainage Description Sanguinous 11/13/2018 11:28 PM  
 
 
 
 
 
 
Flatus: Patient does have flatus present. Stool:  0 occurrences. Characteristics: 
  
Emesis: 0 occurrences. Characteristics: VITAL SIGNS Patient Vitals for the past 12 hrs: 
 Temp Pulse Resp BP SpO2  
11/14/18 0405 97.9 °F (36.6 °C) 63 18 99/65 95 % 11/13/18 2217 97.6 °F (36.4 °C) 74 18 109/76 97 % 11/13/18 2100 98.4 °F (36.9 °C) 82 18 99/58 94 % Pain Assessment Pain Intensity 1: 0 (11/13/18 5958) Patient Stated Pain Goal: 0 Ambulating Yes Shift report given to oncoming nurse at the bedside. Philippe Bates

## 2018-11-14 NOTE — PROGRESS NOTES
TRANSFER - IN REPORT: 
 
Verbal report received from Sylvia Sam RN(name) on Saad King  being received from [facility](unit) for routine progression of care Report consisted of patients Situation, Background, Assessment and  
Recommendations(SBAR). Information from the following report(s) SBAR, Kardex and MAR was reviewed with the receiving nurse. Opportunity for questions and clarification was provided. Assessment to be completed upon patients arrival to unit and care assumed.

## 2018-11-14 NOTE — PROGRESS NOTES
TRANSFER - OUT REPORT: 
 
Verbal report given to Jenna Olmstead RN(name) on Hui Mcadams  being transferred to 2nd Floor(unit) for routine progression of care Report consisted of patients Situation, Background, Assessment and  
Recommendations(SBAR). Information from the following report(s) SBAR was reviewed with the receiving nurse. Lines:    
 
Opportunity for questions and clarification was provided. Patient transported with: 
 [email or web address]

## 2018-11-14 NOTE — PROGRESS NOTES
[address] [facility], [address] [facility] 
[phone] Progress Note Lea Francisco    Admit date: 2018 MRN: [id number]     : 1960     Age: 60 y.o.       
 
2018 5:15 PM 
 
Subjective/HPI:  
This patient is a 60 y.o. sent to ED by her PCP. Pt reports about one week of worsening abdominal pain, mainly in LLQ. She had a CT scan today that is below. C/w perforated diverticulitis and contained abscess. Pt has had diverticulitis in past requiring abx. Normal colonoscopy by Dr Priscilla Emmanuel recently Denies fevers or chills. Has nausea but no  Vomiting. Has been constipated over last week. No bleeding 18: Up in chair, no new complaints. AF, tachy overnight. Labs stable. WBC down to 9.9 from 14.7. 
18: Up in chair, no complaints. Drain placed yesterday. No growth so far. NAD. Review of Systems A comprehensive review of systems was negative except for: Constitutional: positive for fatigue, malaise and anorexia Gastrointestinal: positive for nausea, constipation and abdominal pain Past Medical History:  
Diagnosis Date  Diaphragmatic hernia without mention of obstruction or gangrene 2015  Diverticulosis  Effusion of ankle and foot joint  Encounter for long-term (current) use of other medications  Helminth infection, unspecified  Morbid obesity ([facility] [address] 2015  Nontoxic uninodular goiter 2015  Other specified erythematous condition(695.89)  Overweight(278.02) Past Surgical History:  
Procedure Laterality Date 1 [facility] [address] [facility] No Known Allergies Social History Tobacco Use  Smoking status: Never Smoker  Smokeless tobacco: Never Used Substance Use Topics  Alcohol use: Yes Comment: rarely Social History Social History Narrative  Not on file Family History Problem Relation Age of Onset  Cancer Mother  Heart Disease Mother  Stroke Father  Diabetes Father  Cancer Brother  Cancer Maternal Grandmother   
     colon cancer Prior to Admission Medications Prescriptions Last Dose Informant Patient Reported? Taking?  
amoxicillin-clavulanate (AUGMENTIN) 875-125 mg per tablet   No No  
Sig: Take 1 Tab by mouth every twelve (12) hours. ibuprofen 200 mg cap   Yes No  
Sig: Take  by mouth as needed. raNITIdine (ZANTAC) 150 mg tablet   No No  
Sig: Take 1 Tab by mouth two (2) times a day. Facility-Administered Medications: None Current Facility-Administered Medications Medication Dose Route Frequency  acetaminophen (TYLENOL) tablet 325 mg  325 mg Oral Q6H PRN  
 HYDROcodone-acetaminophen (NORCO) 5-325 mg per tablet 1 Tab  1 Tab Oral Q4H PRN  
 0.45% sodium chloride with KCl 20 mEq/L infusion   IntraVENous CONTINUOUS  
 pantoprazole (PROTONIX) 40 mg in sodium chloride 0.9% 10 mL injection  40 mg IntraVENous DAILY  HYDROmorphone (PF) (DILAUDID) injection 0.5 mg  0.5 mg IntraVENous Q3H PRN  
 ondansetron (ZOFRAN) injection 4 mg  4 mg IntraVENous Q4H PRN  piperacillin-tazobactam (ZOSYN) 3.375 g in 0.9% sodium chloride (MBP/ADV) 100 mL  3.375 g IntraVENous Q8H Objective:  
 
Vitals:  
 11/13/18 2100 11/13/18 2217 11/14/18 0405 11/14/18 5974 BP: 99/58 109/76 99/65 112/72 Pulse: 82 74 63 67 Resp: 18 18 18 18 Temp: 98.4 °F (36.9 °C) 97.6 °F (36.4 °C) 97.9 °F (36.6 °C) 98 °F (36.7 °C) SpO2: 94% 97% 95% 95% Weight:      
Height:      
 
11/14 0701 - 11/14 1900 In: 343 [P.O.:343] Out: 400 [Urine:400] 11/12 1901 - 11/14 0700 In: 8113 [I.V.:1769] Out: - Physical Exam:  
Gen- the patient is well developed and in no acute distress HEENT- PERRL, EOMI, no scleral icterus 
     nose without alar flaring or epistaxis 
                oral muscosa moist without cyanosis Neck- no JVD or retractions Lungs- resp even/unlab Heart- RRR  
 Abd- soft, nondistended. Improved tenderness, sore around drain insertion. No rebound. No mass or hernia found Ext- warm without cyanosis. There is no lower leg edema. Skin- no jaundice or rashes Neuro- alert and oriented x 3. No gross sensorimotor deficits are present. Data Review Recent Labs 11/14/18 
374 780 163 11/13/18 
0553 11/12/18 
1605 WBC 12.1* 9.9 14.7* HGB 10.0* 11.1* 11.7 HCT 31.7* 35.0* 36.6  441 472* Recent Labs 11/14/18 
374 780 163 11/13/18 
0553 11/12/18 
1605  137 135* K 4.3 4.2 4.0  
 102 100 CO2 25 28 26 GLU 95 88 94 BUN 10 10 11 CREA 1.04* 0.98 0.98 Assessment:  
 
Hospital Problems  Date Reviewed: 11/9/2018 Codes Class Noted POA * (Principal) Diverticulitis of intestine with abscess without bleeding ICD-10-CM: K57.80 ICD-9-CM: 562.11, 569.5  11/12/2018 Yes Overview Signed 11/14/2018  8:11 AM by Pau Paniagua NP  
  11/13/18 IR CT guided drain placement pelvic abscess Plan:  
 
Continue present care Continue IV ABX Await cx GI soft diet Stop IVFs Hopefully home tomorrow Aamir Alonso NP

## 2018-11-14 NOTE — PROGRESS NOTES
Admission assessment complete via doc flow sheet. A+OX4. RR even and unlabored. HR regular. LS CTA. Abd soft, active bowel sounds. Bed LL. Oriented to surroundings, staff and call light. All needs met at this time. Staff will monitor with hourly rounds.

## 2018-11-14 NOTE — PROGRESS NOTES
11/13/18 2329 Dual Skin Pressure Injury Assessment Dual Skin Pressure Injury Assessment WDL Skin Integumentary Skin Integumentary (WDL) WDL

## 2018-11-15 VITALS
BODY MASS INDEX: 37.36 KG/M2 | HEIGHT: 58 IN | TEMPERATURE: 98.1 F | WEIGHT: 178 LBS | HEART RATE: 71 BPM | RESPIRATION RATE: 18 BRPM | OXYGEN SATURATION: 97 % | SYSTOLIC BLOOD PRESSURE: 111 MMHG | DIASTOLIC BLOOD PRESSURE: 74 MMHG

## 2018-11-15 PROCEDURE — 74011250637 HC RX REV CODE- 250/637: Performed by: NURSE PRACTITIONER

## 2018-11-15 PROCEDURE — 74011250637 HC RX REV CODE- 250/637: Performed by: SURGERY

## 2018-11-15 PROCEDURE — 74011000258 HC RX REV CODE- 258: Performed by: SURGERY

## 2018-11-15 PROCEDURE — 74011250636 HC RX REV CODE- 250/636: Performed by: SURGERY

## 2018-11-15 RX ORDER — METRONIDAZOLE 500 MG/1
500 TABLET ORAL 2 TIMES DAILY
Qty: 20 TAB | Refills: 0 | Status: SHIPPED | OUTPATIENT
Start: 2018-11-15 | End: 2018-11-25

## 2018-11-15 RX ORDER — CIPROFLOXACIN 750 MG/1
750 TABLET, FILM COATED ORAL 2 TIMES DAILY
Qty: 20 TAB | Refills: 0 | Status: SHIPPED | OUTPATIENT
Start: 2018-11-15 | End: 2018-11-25

## 2018-11-15 RX ORDER — TRAMADOL HYDROCHLORIDE 50 MG/1
TABLET ORAL
Qty: 15 TAB | Refills: 0 | Status: SHIPPED | OUTPATIENT
Start: 2018-11-15 | End: 2018-11-27

## 2018-11-15 RX ADMIN — ACETAMINOPHEN 650 MG: 325 TABLET, FILM COATED ORAL at 08:57

## 2018-11-15 RX ADMIN — PIPERACILLIN SODIUM,TAZOBACTAM SODIUM 3.38 G: 3; .375 INJECTION, POWDER, FOR SOLUTION INTRAVENOUS at 06:11

## 2018-11-15 RX ADMIN — PANTOPRAZOLE SODIUM 40 MG: 40 TABLET, DELAYED RELEASE ORAL at 06:11

## 2018-11-15 NOTE — PROGRESS NOTES
[address] November 15, 2018 RE: Saad King 
 
 
To Whom It May Concern, This is to certify that Saad King was hospitalized from 11-12-18 through 11-15-18. She may return to work on 11-26-18. Please feel free to contact my office if you have any questions or concerns. Thank you for your assistance in this matter.  
 
 
Sincerely, 
[facility]

## 2018-11-15 NOTE — PROGRESS NOTES
Discharge teaching complete. Patient verbalized understanding of diet, activity, MUKESH drain care, s/sx as reviewed, and follow up appointments. Drain supplies given to patient. Rx and discharge instructions given to patient. IV d/c'd with tip intact and dressing applied.

## 2018-11-15 NOTE — DISCHARGE INSTRUCTIONS
Discharge Instructions/Follow-up Plans:   MD Instructions:     Follow-up with Dr. Manav Fink in 2 weeks. Follow-up with IR in 7-10 days. Keep incisions clean and dry, may remain uncovered. Do not apply lotions, creams or ointments to incisions. Empty drain twice daily and record drainage amounts. Bring record to your follow-up visit for review. Flush drain as instructed.     Diet - soft foods  Activity -as tolerated  May shower - no tub baths or soaking/submerging.     No driving while taking narcotics. Do not drink alcohol while taking narcotics. Resume other home medications.      If problems or questions arise, please call our office at [phone].     Greater than 30 minutes were spent discharging the patient      DISCHARGE SUMMARY from Nurse    PATIENT INSTRUCTIONS:    After general anesthesia or intravenous sedation, for 24 hours or while taking prescription Narcotics:  · Limit your activities  · Do not drive and operate hazardous machinery  · Do not make important personal or business decisions  · Do  not drink alcoholic beverages  · If you have not urinated within 8 hours after discharge, please contact your surgeon on call. Report the following to your surgeon:  · Excessive pain, swelling, redness or odor of or around the surgical area  · Temperature over 100.5  · Nausea and vomiting lasting longer than 4 hours or if unable to take medications  · Any signs of decreased circulation or nerve impairment to extremity: change in color, persistent  numbness, tingling, coldness or increase pain  · Any questions    What to do at Home:  Recommended activity: No lifting greater than 10 pounds, Driving while taking narcotic pain medication, or Strenuous exercise until follow up appointment. If you experience any of the following symptoms fever of 101 or greater, pain unrelieved by medication, uncontrollable nausea or vomiting please follow up with Dr. Manav Fink.     *  Please give a list of your current medications to your Primary Care Provider. *  Please update this list whenever your medications are discontinued, doses are      changed, or new medications (including over-the-counter products) are added. *  Please carry medication information at all times in case of emergency situations. These are general instructions for a healthy lifestyle:    No smoking/ No tobacco products/ Avoid exposure to second hand smoke  Surgeon General's Warning:  Quitting smoking now greatly reduces serious risk to your health. Obesity, smoking, and sedentary lifestyle greatly increases your risk for illness    A healthy diet, regular physical exercise & weight monitoring are important for maintaining a healthy lifestyle    You may be retaining fluid if you have a history of heart failure or if you experience any of the following symptoms:  Weight gain of 3 pounds or more overnight or 5 pounds in a week, increased swelling in our hands or feet or shortness of breath while lying flat in bed. Please call your doctor as soon as you notice any of these symptoms; do not wait until your next office visit. Recognize signs and symptoms of STROKE:    F-face looks uneven    A-arms unable to move or move unevenly    S-speech slurred or non-existent    T-time-call 911 as soon as signs and symptoms begin-DO NOT go       Back to bed or wait to see if you get better-TIME IS BRAIN. Warning Signs of HEART ATTACK     Call 911 if you have these symptoms:   Chest discomfort. Most heart attacks involve discomfort in the center of the chest that lasts more than a few minutes, or that goes away and comes back. It can feel like uncomfortable pressure, squeezing, fullness, or pain.  Discomfort in other areas of the upper body. Symptoms can include pain or discomfort in one or both arms, the back, neck, jaw, or stomach.  Shortness of breath with or without chest discomfort.    Other signs may include breaking out in a cold sweat, nausea, or lightheadedness. Don't wait more than five minutes to call 911 - MINUTES MATTER! Fast action can save your life. Calling 911 is almost always the fastest way to get lifesaving treatment. Emergency Medical Services staff can begin treatment when they arrive -- up to an hour sooner than if someone gets to the hospital by car. The discharge information has been reviewed with the patient. The patient verbalized understanding. Discharge medications reviewed with the patient and appropriate educational materials and side effects teaching were provided. ___________________________________________________________________________________________________________________________________         Diverticulitis: Care Instructions  Your Care Instructions    Diverticulitis occurs when pouches form in the wall of the colon and become inflamed or infected. It can be very painful. Doctors aren't sure what causes diverticulitis. There is no proof that foods such as nuts, seeds, or berries cause it or make it worse. A low-fiber diet may cause the colon to work harder to push stool forward. Pouches may form because of this extra work. It may be hard to think about healthy eating while you're in pain. But as you recover, you might think about how you can use healthy eating for overall better health. Healthy eating may help you avoid future attacks. Follow-up care is a key part of your treatment and safety. Be sure to make and go to all appointments, and call your doctor if you are having problems. It's also a good idea to know your test results and keep a list of the medicines you take. How can you care for yourself at home? · Drink plenty of fluids, enough so that your urine is light yellow or clear like water. If you have kidney, heart, or liver disease and have to limit fluids, talk with your doctor before you increase the amount of fluids you drink.   · Stick to liquids or a bland diet (plain rice, bananas, dry toast or crackers, applesauce) until you are feeling better. Then you can return to regular foods and gradually increase the amount of fiber in your diet. · Use a heating pad set on low on your belly to relieve mild cramps and pain. · Get extra rest until you are feeling better. · Be safe with medicines. Read and follow all instructions on the label. ? If the doctor gave you a prescription medicine for pain, take it as prescribed. ? If you are not taking a prescription pain medicine, ask your doctor if you can take an over-the-counter medicine. · If your doctor prescribed antibiotics, take them as directed. Do not stop taking them just because you feel better. You need to take the full course of antibiotics. To prevent future attacks of diverticulitis  · Avoid constipation:  ? Include fruits, vegetables, beans, and whole grains in your diet each day. These foods are high in fiber. ? Drink plenty of fluids, enough so that your urine is light yellow or clear like water. If you have kidney, heart, or liver disease and have to limit fluids, talk with your doctor before you increase the amount of fluids you drink. ? Get some exercise every day. Build up slowly to 30 to 60 minutes a day on 5 or more days of the week. ? Take a fiber supplement, such as Citrucel or Metamucil, every day if needed. Read and follow all instructions on the label. ? Schedule time each day for a bowel movement. Having a daily routine may help. Take your time and do not strain when having a bowel movement. When should you call for help? Call your doctor now or seek immediate medical care if:    · You have a fever.     · You are vomiting.     · You have new or worse belly pain.     · You cannot pass stools or gas.    Watch closely for changes in your health, and be sure to contact your doctor if you have any problems. Where can you learn more? Go to http://ulisses-bertin.info/.   Enter H901 in the search box to learn more about \"Diverticulitis: Care Instructions. \"  Current as of: March 28, 2018  Content Version: 11.8  © 1561-0877 [employer]. Care instructions adapted under license by "[employer] (which disclaims liability or warranty for this information). If you have questions about a medical condition or this instruction, always ask your healthcare professional. [address] any warranty or liability for your use of this information. Surgical Drain Care: Care Instructions  Your Care Instructions    After a surgery, fluid may collect inside your body in the surgical area. This makes an infection or other problems more likely. A surgical drain allows the fluid to flow out. The doctor puts a thin, flexible rubber tube into the area of your body where the fluid is likely to collect. The rubber tube carries the fluid outside your body. The most common type of surgical drain carries the fluid into a collection bulb that you empty. This is called a Girish-Rios (MUKESH) drain. The drain uses suction created by the bulb to pull the fluid from your body into the bulb. The rubber tube will probably be held in place by one or two stitches in your skin. The bulb will probably be attached with a safety pin to your clothes or near the bandage so that it doesn't flip around or pull on the stitches. Another type of drain is called a Penrose drain. This type of drain doesn't have a bulb. Instead, the end of the tube is open. That allows the fluid to drain onto a dressing taped to your skin. The drain may be kept in place next to your skin with a stitch or a safety pin in the tube. When you first get the drain, the fluid will be bloody. It will change color from red to pink to a light yellow or clear as the wound heals and the fluid starts to go away. Your doctor may give you information on when you no longer need the drain and when it will be removed.   Follow-up care is a key part of your treatment and safety. Be sure to make and go to all appointments, and call your doctor if you are having problems. It's also a good idea to know your test results and keep a list of the medicines you take. How can you care for yourself at home? How to empty the bulb of a Girish-Rios drain  Follow any instructions your doctor gives you. How often you empty the bulb depends on how much fluid is draining. Empty the bulb when it is half full. 1. Wash your hands with soap and water. 2. Take the plug out of the bulb. 3. Empty the bulb. If your doctor asks you to measure the fluid, empty the fluid into a measuring cup, and write down the color and how much you collected. Your doctor will want to know this information. 4. Clean the plug with alcohol. 5. Squeeze the bulb until it is flat. This removes all the air from the bulb. You may need to put the bulb on a table or a counter to flatten it. 6. Keep the bulb flat, and put the plug in. The bulb should stay flat after you put the plug back in. This creates the suction that pulls the fluid into the bulb. 7. Empty the fluid into the toilet. 8. Wash your hands. How to change the dressing around your surgical drain  You may have a dressing (bandage). The dressing is often made of gauze pads held on with tape. Your doctor will tell you how often to change it. 1. Wash your hands with soap and water. 2. Take off the dressing from around the drain. 3. Clean the drain site and the skin around it with soap and water. Use gauze or a cotton swab. 4. When the site is dry, put on a new dressing. The way your dressing is put on depends on what kind of drain you have. You will get instructions for your type of drain. 5. Wash your hands again with soap and water. Your doctor may ask you to keep track of your dressing changes. Write down the time of day and the amount and color of the fluid on the dressing.   How to help prevent clogs in your surgical drain  Squeezing or \"milking\" the tube of your surgical drain can help prevent clogs so that it drains correctly. Your doctor will tell you when you need to do this. In general, you do this when:  · You see a clot in the tube that prevents fluid from draining. The clot may look like a dark, stringy lining. · You see fluid leaking around the tube where it goes into the skin. Follow these steps for milking the tube. 1. Use one hand to hold and pinch the tube where it leaves the skin. 2. With the thumb and first finger of your other hand, pinch the tube just below where you're holding it. 3. Slowly and firmly push your thumb and first finger down the tubing toward the end of the tube. 4. Repeat this as many times as needed to move the clot. If you have a Girish-Rios (MUKESH) drain, the clot should move down the tube and into the bulb. If you have a Penrose drain, the clot should move into the dressing. When should you call for help? Call your doctor now or seek immediate medical care if:    · You have signs of infection, such as:  ? Increased pain, swelling, warmth, or redness around the area. ? Red streaks leading from the area. ? Pus draining from the area. ? A fever.     · You see a sudden change in the color or smell of the drainage.     · The tube is coming loose where it leaves your skin.    Watch closely for changes in your health, and be sure to contact your doctor if:    · You see a lot of fluid around the drain.     · You cannot remove a clot from the tube by milking the tube. Where can you learn more? Go to http://ulisses-bertin.info/. Enter K117 in the search box to learn more about \"Surgical Drain Care: Care Instructions. \"  Current as of: January 10, 2018  Content Version: 11.8  © 7756-8225 [employer]. Care instructions adapted under license by [employer] (which disclaims liability or warranty for this information).  If you have questions about a medical condition or this instruction, always ask your healthcare professional. [place] [address] any warranty or liability for your use of this information.

## 2018-11-15 NOTE — DISCHARGE SUMMARY
[address] [facility]  [phone]   Discharge Summary     Sade Shah  MRN: [id number]     : 1960     Age: 60 y.o. Admit date: 2018     Discharge date: 11/15/2018  Attending Physician: Donna Vail MD  Primary Discharge Diagnosis:   Principal Problem:    Diverticulitis of intestine with abscess without bleeding (2018)      Overview: 18 IR CT guided drain placement pelvic abscess                  Primary Operations or Procedures Performed :  * No surgery found *     Brief History and Reason for Admission: Sade Shah was admitted with the following history of present illness. This patient is a 60 y.o. sent to ED by her PCP. Pt reports about one week of worsening abdominal pain, mainly in LLQ. She had a CT scan today that is below. C/w perforated diverticulitis and contained abscess. Pt has had diverticulitis in past requiring abx. Normal colonoscopy by Dr Breanne Pedro recently   Denies fevers or chills. Has nausea but no  Vomiting. Has been constipated over last week. No bleeding     Hospital Course: The patient was evaluated by interventional radiology and a drain was placed in the abscess cavity. Cultures grew gram negative rods and full culture results were not back at the time of discharge. She was sent home with pain medication and Cipro/Flagyl. Follow up was arranged with Dr. Juan Manuel Butler and IR. Condition at Discharge: Good    Discharge Medications:   Current Discharge Medication List      START taking these medications    Details   ciprofloxacin HCl (CIPRO) 750 mg tablet Take 1 Tab by mouth two (2) times a day for 10 days. Qty: 20 Tab, Refills: 0      metroNIDAZOLE (FLAGYL) 500 mg tablet Take 1 Tab by mouth two (2) times a day for 10 days.   Qty: 20 Tab, Refills: 0      traMADol (ULTRAM) 50 mg tablet 1 tabs by mouth every 6 hours prn pain  Qty: 15 Tab, Refills: 0 Associated Diagnoses: Diverticulitis of intestine with abscess without bleeding, unspecified part of intestinal tract         CONTINUE these medications which have NOT CHANGED    Details   raNITIdine (ZANTAC) 150 mg tablet Take 1 Tab by mouth two (2) times a day. Qty: 60 Tab, Refills: 11    Associated Diagnoses: Visit for screening mammogram         STOP taking these medications       amoxicillin-clavulanate (AUGMENTIN) 875-125 mg per tablet Comments:   Reason for Stopping:         ibuprofen 200 mg cap Comments:   Reason for Stopping:                 Disposition: Home    Discharge Instructions/Follow-up Care:      MD Instructions:     Follow-up with Dr. Trent Crowder in 2 weeks. Follow-up with IR in 7-10 days. Keep incisions clean and dry, may remain uncovered. Do not apply lotions, creams or ointments to incisions. Empty drain twice daily and record drainage amounts. Bring record to your follow-up visit for review. Flush drain as instructed.     Diet - soft foods  Activity -as tolerated  May shower - no tub baths or soaking/submerging.     No driving while taking narcotics. Do not drink alcohol while taking narcotics.   Resume other home medications.      If problems or questions arise, please call our office at [phone].     Greater than 30 minutes were spent discharging the patient    Signed:  Lori Colindres NP   11/15/2018  8:31 AM

## 2018-11-15 NOTE — PROGRESS NOTES
Department of Interventional Radiology 
[phone] Progress NotePatient: Shante Terry MRN: [id number]  SSN: [id number] YOB: 1960  Age: 60 y.o. Sex: female Subjective:  
Leaking with flushes. Drain related discomfort Objective:  
 
Vitals:  
 11/14/18 2323 11/15/18 0342 11/15/18 0730 11/15/18 1050 BP: 117/74 115/78 125/85 111/74 Pulse: 77 78 77 71 Resp: 18 18 18 18 Temp: 97.9 °F (36.6 °C) 98.1 °F (36.7 °C) 97.4 °F (36.3 °C) 98.1 °F (36.7 °C) SpO2: 96% 96% 97% 97% Weight:      
Height:      
  
Intake and Output: 
Drain Pelvic Abscess Drain 11/13/18 Left; Lower Abdomen (Active) Site Assessment Clean, dry, & intact 11/15/2018  8:50 AM  
Dressing Status Clean, dry, & intact 11/15/2018  8:50 AM  
Status Patent; Charged;Draining 11/15/2018  8:50 AM  
Drainage Description Serosanguinous 11/15/2018  8:50 AM  
Intake (ml) 10 ml 11/14/2018 10:12 PM  
Output (ml) 5 ml 11/15/2018  7:30 AM  
 
Physical Exam:  
drain flushes easily, leaks only if luer lock not secured. Discomfort with flushing. site ok Lab/Data Review: All lab results for the last 24 hours reviewed. Assessment:  
Diverticular abscess Plan:  
F/u IR 11/20 as scheduled. Flushing demonstrated again. Daily at discharge Signed By: Michelle Norirs PA-C November 15, 2018

## 2018-11-17 LAB
BACTERIA SPEC CULT: ABNORMAL
GRAM STN SPEC: ABNORMAL
GRAM STN SPEC: ABNORMAL
SERVICE CMNT-IMP: ABNORMAL

## 2018-11-20 ENCOUNTER — HOSPITAL ENCOUNTER (OUTPATIENT)
Dept: INTERVENTIONAL RADIOLOGY/VASCULAR | Age: 58
Discharge: HOME | End: 2018-11-20
Attending: RADIOLOGY
Payer: COMMERCIAL

## 2018-11-20 VITALS
TEMPERATURE: 97.8 F | HEIGHT: 59 IN | BODY MASS INDEX: 35.28 KG/M2 | SYSTOLIC BLOOD PRESSURE: 131 MMHG | DIASTOLIC BLOOD PRESSURE: 78 MMHG | RESPIRATION RATE: 20 BRPM | HEART RATE: 110 BPM | WEIGHT: 175 LBS

## 2018-11-20 PROCEDURE — 76080 X-RAY EXAM OF FISTULA: CPT

## 2018-11-20 PROCEDURE — 77030027478 HC TBNG JP W BLB MRTM -B

## 2018-11-20 PROCEDURE — 74011250637 HC RX REV CODE- 250/637: Performed by: RADIOLOGY

## 2018-11-20 PROCEDURE — 74011636320 HC RX REV CODE- 636/320: Performed by: RADIOLOGY

## 2018-11-20 RX ORDER — ACETAMINOPHEN 325 MG/1
650 TABLET ORAL ONCE
Status: COMPLETED | OUTPATIENT
Start: 2018-11-20 | End: 2018-11-20

## 2018-11-20 RX ADMIN — IOPAMIDOL 6 ML: 612 INJECTION, SOLUTION INTRAVENOUS at 09:33

## 2018-11-20 RX ADMIN — ACETAMINOPHEN 650 MG: 325 TABLET, FILM COATED ORAL at 10:05

## 2018-11-20 NOTE — H&P
Department of Interventional Radiology  [phone]    History and Physical    Patient:  Michael Brunner MRN:  [id number]  SSN:  [id number]    YOB: 1960  Age:  60 y.o. Sex:  female      Primary Care Provider:  Amari Benson MD  Referring Physician:  Suresh Lawrence MD    Date of Surgery Update: Michael Brunner was seen and examined. History and physical has been reviewed 64/48/00 Dr. Martir Whelan. Significant clinical changes have occurred as noted:  Diverticular abscess drain placed. Visit Vitals  /78 (BP 1 Location: Right arm, BP Patient Position: At rest;Supine)   Pulse (!) 110   Temp 97.8 °F (36.6 °C)   Resp 20   Ht 4' 11\" (1.499 m)   Wt 79.4 kg (175 lb)   Breastfeeding?  No   BMI 35.35 kg/m²     Pt returns for drain check    Signed By: Deb Moreira PA-C     November 20, 2018 9:21 AM

## 2018-11-20 NOTE — PROGRESS NOTES
Verbalized understanding of discharge instructions. Follow up appointment and supplies provided. Daughter at bedside to drive home.

## 2018-11-20 NOTE — PROGRESS NOTES
TRANSFER - OUT REPORT:    Verbal report given to HERMILA Arceo (name) on Niki Jose  being transferred to IR recovery 3 (unit) for routine progression of care   After drain check. F/u in 7 days. Report consisted of patients Situation, Background, Assessment and   Recommendations(SBAR). Information from the following report(s) Procedure Summary, Intake/Output and MAR was reviewed with the receiving nurse. Opportunity for questions and clarification was provided.

## 2018-11-20 NOTE — DISCHARGE INSTRUCTIONS
[address] [facility] [address]  Department of Interventional Radiology  [facility]  [phone] Office  [phone] Fax    GENERAL DRAIN DISCHARGE INSTRUCTIONS    General Information:     A plastic catheter has been inserted through your skin and into area that needs to be drained. Your doctor ordered this procedure to be done in the event of an abscess, or other fluid collection in your body. You will notice a drainage bag attached to the catheter. When the fluid has all been removed, your doctor will send you back to us for the removal of the catheter. If you are going home with the catheter in place, your doctor may order a home health nurse to come to your house and assist with the care of the catheter. Your doctor will most likely order antibiotic tablets for you to take while you have this tube. Home Care Instructions: You can resume your regular diet and medication regimen. Do not drink alcohol, drive, or make any important legal decisions in the next 24 hours. Do not lift anything heavier than a gallon of milk, or do anything strenuous for the next 24 hours. You should not shower for 24 hours. You should cover the tube with plastic wrap and tape to keep it dry when you shower. You can disconnect the drainage bag while showering. The home health nurse or the nurse who discharges from the hospital will teach you how to do this. Do not take a bath, swim or immerse yourself in water as long as you have this drainage tube. You should clean the tube and change the dressing every  48 hours and as needed. Keep the dressing clean and dry. Keep up with how much drainage you get from the tube and report this to your doctor on each visit. Call If:     You should call your Physician and/or the Radiology Nurse if you have any bleeding other than a small spot on your bandage.  Call if you have any signs of infection, fever, or increased pain at the site of the tube. Call if you should have leakage around the tube, an increased yellowing of the skin, increased pain in the abdomen, a change in the amount or appearance of the fluid, or if the tube gets pulled out some or all the way out. Follow-Up Instructions: Please see your ordering doctor as he/she has requested. To Reach Us: If you have any questions about your procedure, please call the Interventional Radiology department at [phone]. After business hours (5pm) and weekends, call the answering service at [phone] and ask for the Radiologist on call to be paged. Interventional Radiology General Nurse Discharge    * Please give a list of your current medications to your Primary Care Provider. * Please update this list whenever your medications are discontinued, doses are     changed, or new medications (including over-the-counter products) are added. * Please carry medication information at all times in case of emergency situations. These are general instructions for a healthy lifestyle:    No smoking/ No tobacco products/ Avoid exposure to second hand smoke  Surgeon General's Warning:  Quitting smoking now greatly reduces serious risk to your health. Obesity, smoking, and sedentary lifestyle greatly increases your risk for illness  A healthy diet, regular physical exercise & weight monitoring are important for maintaining a healthy lifestyle    You may be retaining fluid if you have a history of heart failure or if you experience any of the following symptoms:  Weight gain of 3 pounds or more overnight or 5 pounds in a week, increased swelling in our hands or feet or shortness of breath while lying flat in bed. Please call your doctor as soon as you notice any of these symptoms; do not wait until your next office visit.     Recognize signs and symptoms of STROKE:  F-face looks uneven    A-arms unable to move or move unevenly    S-speech slurred or non-existent    T-time-call 911 as soon as signs and symptoms begin-DO NOT go       Back to bed or wait to see if you get better-TIME IS BRAIN.       Patient Signature:  Date: 11/20/2018  Discharging Nurse: Yan Sevilla RN

## 2018-11-20 NOTE — PROGRESS NOTES
IR Nurse Pre-Procedure Checklist Part 2          Consent signed: Yes    H&P complete:  Yes    Antibiotics: No    Airway/Mallampati Done: No    Shaved:  No    Pregnancy Form:No    Patient Position: Yes    MD Side: Yes     Biopsy Worksheet: No    Specimen Medium: No

## 2018-11-27 ENCOUNTER — HOSPITAL ENCOUNTER (OUTPATIENT)
Dept: INTERVENTIONAL RADIOLOGY/VASCULAR | Age: 58
Discharge: HOME | End: 2018-11-27
Attending: RADIOLOGY
Payer: COMMERCIAL

## 2018-11-27 VITALS
TEMPERATURE: 98 F | HEART RATE: 63 BPM | SYSTOLIC BLOOD PRESSURE: 130 MMHG | OXYGEN SATURATION: 98 % | DIASTOLIC BLOOD PRESSURE: 66 MMHG | RESPIRATION RATE: 16 BRPM

## 2018-11-27 DIAGNOSIS — L02.91 ABSCESS: ICD-10-CM

## 2018-11-27 PROCEDURE — 77030027478 HC TBNG JP W BLB MRTM -B

## 2018-11-27 PROCEDURE — 76080 X-RAY EXAM OF FISTULA: CPT

## 2018-11-27 PROCEDURE — 74011636320 HC RX REV CODE- 636/320: Performed by: RADIOLOGY

## 2018-11-27 RX ORDER — LIDOCAINE HYDROCHLORIDE 20 MG/ML
1-20 INJECTION, SOLUTION EPIDURAL; INFILTRATION; INTRACAUDAL; PERINEURAL ONCE
Status: DISCONTINUED | OUTPATIENT
Start: 2018-11-27 | End: 2018-11-27

## 2018-11-27 RX ADMIN — IOPAMIDOL 5 ML: 612 INJECTION, SOLUTION INTRAVENOUS at 15:48

## 2018-11-27 NOTE — PROCEDURES
Department of Interventional Radiology  [phone]        Interventional Radiology Brief Procedure Note    Patient: Juhi Covarrubias MRN: [id number]  SSN: [id number]    YOB: 1960  Age: 60 y.o. Sex: female      Date of Procedure: 11/27/2018    Pre-Procedure Diagnosis: Peritoneal abscess. Diverticulitis. Post-Procedure Diagnosis: SAME    Procedure(s): Abscessogram    Brief Description of Procedure: as above    Performed By: Mihaela Rose MD     Assistants: None    Anesthesia:None    Estimated Blood Loss: None    Specimens:  None    Implants: All Purpose Drain left in place    Findings: Small volume cavity. No fistula. Complications: None    Recommendations: Suction bulb. No flushing. Follow Up: 7-14 days in IR.       Signed By: Mihaela Rose MD     November 27, 2018

## 2018-11-27 NOTE — DISCHARGE INSTRUCTIONS
[address]  Department of Interventional Radiology  [address] [place] [address] [address] [facility]  [phone] Office  [phone] Fax    GENERAL DRAIN DISCHARGE INSTRUCTIONS    General Information:     A plastic catheter has been inserted through your skin and into area that needs to be drained. Your doctor ordered this procedure to be done in the event of an abscess, or other fluid collection in your body. You will notice a drainage bag attached to the catheter. When the fluid has all been removed, your doctor will send you back to us for the removal of the catheter. If you are going home with the catheter in place, your doctor may order a home health nurse to come to your house and assist with the care of the catheter. Your doctor will most likely order antibiotic tablets for you to take while you have this tube. Home Care Instructions: You can resume your regular diet and medication regimen. Do not drink alcohol, drive, or make any important legal decisions in the next 24 hours. Do not lift anything heavier than a gallon of milk, or do anything strenuous for the next 24 hours. You should not shower for 24 hours. You should cover the tube with plastic wrap and tape to keep it dry when you shower. You can disconnect the drainage bag while showering. The home health nurse or the nurse who discharges from the hospital will teach you how to do this. Do not take a bath, swim or immerse yourself in water as long as you have this drainage tube. You should clean the tube and change the dressing every  48 hours and as needed. Keep the dressing clean and dry. Keep up with how much drainage you get from the tube and report this to your doctor on each visit. Call If:     You should call your Physician and/or the Radiology Nurse if you have any bleeding other than a small spot on your bandage.  Call if you have any signs of infection, fever, or increased pain at the site of the tube. Call if you should have leakage around the tube, an increased yellowing of the skin, increased pain in the abdomen, a change in the amount or appearance of the fluid, or if the tube gets pulled out some or all the way out. Follow-Up Instructions: Please see your ordering doctor as he/she has requested. To Reach Us: If you have any questions about your procedure, please call the Interventional Radiology department at [phone]. After business hours (5pm) and weekends, call the answering service at [phone] and ask for the Radiologist on call to be paged. Interventional Radiology General Nurse Discharge    After general anesthesia or intravenous sedation, for 24 hours or while taking prescription Narcotics:  · Limit your activities  · Do not drive and operate hazardous machinery  · Do not make important personal or business decisions  · Do  not drink alcoholic beverages  · If you have not urinated within 8 hours after discharge, please contact your surgeon on call. * Please give a list of your current medications to your Primary Care Provider. * Please update this list whenever your medications are discontinued, doses are     changed, or new medications (including over-the-counter products) are added. * Please carry medication information at all times in case of emergency situations. These are general instructions for a healthy lifestyle:    No smoking/ No tobacco products/ Avoid exposure to second hand smoke  Surgeon General's Warning:  Quitting smoking now greatly reduces serious risk to your health.     Obesity, smoking, and sedentary lifestyle greatly increases your risk for illness  A healthy diet, regular physical exercise & weight monitoring are important for maintaining a healthy lifestyle    You may be retaining fluid if you have a history of heart failure or if you experience any of the following symptoms:  Weight gain of 3 pounds or more overnight or 5 pounds in a week, increased swelling in our hands or feet or shortness of breath while lying flat in bed. Please call your doctor as soon as you notice any of these symptoms; do not wait until your next office visit. Recognize signs and symptoms of STROKE:  F-face looks uneven    A-arms unable to move or move unevenly    S-speech slurred or non-existent    T-time-call 911 as soon as signs and symptoms begin-DO NOT go       Back to bed or wait to see if you get better-TIME IS BRAIN.        Patient Signature:  Date: 11/27/2018  Discharging Nurse: Paty Sanabria RN

## 2018-12-05 LAB
BACTERIA SPEC CULT: ABNORMAL
Lab: NORMAL
REFERENCE LAB,REFLB: NORMAL
SERVICE CMNT-IMP: ABNORMAL
TEST DESCRIPTION:,ATST: NORMAL

## 2018-12-07 ENCOUNTER — HOSPITAL ENCOUNTER (OUTPATIENT)
Dept: INTERVENTIONAL RADIOLOGY/VASCULAR | Age: 58
Discharge: HOME | End: 2018-12-07
Attending: RADIOLOGY
Payer: COMMERCIAL

## 2018-12-07 VITALS
TEMPERATURE: 97.7 F | OXYGEN SATURATION: 97 % | RESPIRATION RATE: 12 BRPM | SYSTOLIC BLOOD PRESSURE: 123 MMHG | HEART RATE: 80 BPM | DIASTOLIC BLOOD PRESSURE: 63 MMHG

## 2018-12-07 DIAGNOSIS — K65.1 PERITONEAL ABSCESS (HCC): ICD-10-CM

## 2018-12-07 PROCEDURE — 74011636320 HC RX REV CODE- 636/320: Performed by: RADIOLOGY

## 2018-12-07 PROCEDURE — 76080 X-RAY EXAM OF FISTULA: CPT

## 2018-12-07 RX ADMIN — IOPAMIDOL 1 ML: 612 INJECTION, SOLUTION INTRAVENOUS at 13:53

## 2018-12-07 NOTE — DISCHARGE INSTRUCTIONS
[address] [facility]  Department of Interventional Radiology  [facility]  [phone] Office  [phone] Fax    DRAIN/PORT/CATHETER REMOVAL  DISCHARGE INSTRUCTIONS    General Information:     Your doctor has ordered for us to remove your drain, port, or catheter. This could be that you do not need it anymore, it is not doing its job, your physician has decided on another plan for your treatment and/or it may need replacing. Home Care Instructions: You can resume your regular diet and medication regimen. Do not drink alcohol, drive, or make any important legal decisions in the next 24 hours. Do not lift anything heavier than a gallon of milk, or do anything strenuous for the next 24 hours. You will notice a dressing over the site of the removal. This dressing should stay in place until the site is healed. The dressing should be changed at least every 48 hours. You should change the dressing sooner if it becomes soiled or wet. The nurse who discharges you to home should review with you any wound care instructions. Resume your normal level of activity slowly. You may shower after 24 hours, but do not take a bath, swim or immerse yourself in water until the site has healed, and keep the dressing dry with plastic wrap while showering. The site may ooze for a couple of days. This drainage should lessen with each passing day. Call If:     You should call your Physician and/or the Radiology Nurse if you have any bleeding other than a small spot on your bandage. Call if you have any signs of infection, fever, or increased pain at the site of the tube. Call if the oozing increases, if it changes color, or begins to have an odor. Follow-Up Instructions: Please see your ordering doctor as he/she has requested. To Reach Us: If you have any questions about your procedure, please call the Interventional Radiology department at [phone].  After business hours (5pm) and weekends, call the answering service at [phone] and ask for the Radiologist on call to be paged. Interventional Radiology General Nurse Discharge    After general anesthesia or intravenous sedation, for 24 hours or while taking prescription Narcotics:  · Limit your activities  · Do not drive and operate hazardous machinery  · Do not make important personal or business decisions  · Do  not drink alcoholic beverages  · If you have not urinated within 8 hours after discharge, please contact your surgeon on call. * Please give a list of your current medications to your Primary Care Provider. * Please update this list whenever your medications are discontinued, doses are     changed, or new medications (including over-the-counter products) are added. * Please carry medication information at all times in case of emergency situations. These are general instructions for a healthy lifestyle:    No smoking/ No tobacco products/ Avoid exposure to second hand smoke  Surgeon General's Warning:  Quitting smoking now greatly reduces serious risk to your health. Obesity, smoking, and sedentary lifestyle greatly increases your risk for illness  A healthy diet, regular physical exercise & weight monitoring are important for maintaining a healthy lifestyle    You may be retaining fluid if you have a history of heart failure or if you experience any of the following symptoms:  Weight gain of 3 pounds or more overnight or 5 pounds in a week, increased swelling in our hands or feet or shortness of breath while lying flat in bed. Please call your doctor as soon as you notice any of these symptoms; do not wait until your next office visit.     Recognize signs and symptoms of STROKE:  F-face looks uneven    A-arms unable to move or move unevenly    S-speech slurred or non-existent    T-time-call 911 as soon as signs and symptoms begin-DO NOT go       Back to bed or wait to see if you get better-TIME IS BRAIN.         Patient Signature:  Date: 12/7/2018  Discharging Nurse: Jared Reddy

## 2019-03-25 ENCOUNTER — HOSPITAL ENCOUNTER (OUTPATIENT)
Dept: CT IMAGING | Age: 59
Discharge: HOME | End: 2019-03-25
Attending: INTERNAL MEDICINE
Payer: COMMERCIAL

## 2019-03-25 ENCOUNTER — HOSPITAL ENCOUNTER (OUTPATIENT)
Dept: ULTRASOUND IMAGING | Age: 59
Discharge: HOME | End: 2019-03-25
Attending: INTERNAL MEDICINE
Payer: COMMERCIAL

## 2019-03-25 DIAGNOSIS — K86.2 PANCREATIC CYST: ICD-10-CM

## 2019-03-25 DIAGNOSIS — N93.8 DUB (DYSFUNCTIONAL UTERINE BLEEDING): ICD-10-CM

## 2019-03-25 DIAGNOSIS — N85.2 ENLARGED UTERUS: ICD-10-CM

## 2019-03-25 PROCEDURE — 74011000258 HC RX REV CODE- 258: Performed by: INTERNAL MEDICINE

## 2019-03-25 PROCEDURE — 76830 TRANSVAGINAL US NON-OB: CPT

## 2019-03-25 PROCEDURE — 74160 CT ABDOMEN W/CONTRAST: CPT

## 2019-03-25 PROCEDURE — 74011636320 HC RX REV CODE- 636/320: Performed by: INTERNAL MEDICINE

## 2019-03-25 RX ORDER — SODIUM CHLORIDE 0.9 % (FLUSH) 0.9 %
10 SYRINGE (ML) INJECTION
Status: COMPLETED | OUTPATIENT
Start: 2019-03-25 | End: 2019-03-25

## 2019-03-25 RX ADMIN — Medication 10 ML: at 15:16

## 2019-03-25 RX ADMIN — IOPAMIDOL 100 ML: 755 INJECTION, SOLUTION INTRAVENOUS at 15:16

## 2019-03-25 RX ADMIN — SODIUM CHLORIDE 100 ML: 900 INJECTION, SOLUTION INTRAVENOUS at 15:16

## 2019-03-25 NOTE — PROGRESS NOTES
You have a thickened endometrium. For this reason, I'd like to refer you to a gynecologist to sample this and make sure no cause for concern.     Dr Caroline Roper urgently for dx dysfunctional uterine bleeding

## 2019-04-23 PROBLEM — Z80.0 FAMILY HISTORY OF COLON CANCER: Status: ACTIVE | Noted: 2019-04-23

## 2019-04-23 PROBLEM — N95.0 POSTMENOPAUSAL BLEEDING: Status: ACTIVE | Noted: 2019-04-23

## 2019-05-02 ENCOUNTER — HOSPITAL ENCOUNTER (OUTPATIENT)
Dept: CT IMAGING | Age: 59
Discharge: HOME | End: 2019-05-02
Attending: OBSTETRICS & GYNECOLOGY
Payer: COMMERCIAL

## 2019-05-02 ENCOUNTER — HOSPITAL ENCOUNTER (OUTPATIENT)
Dept: LAB | Age: 59
Discharge: HOME | End: 2019-05-02
Payer: COMMERCIAL

## 2019-05-02 VITALS — BODY MASS INDEX: 39.27 KG/M2 | WEIGHT: 200 LBS | HEIGHT: 60 IN

## 2019-05-02 DIAGNOSIS — N71.9 PYOMETRA: ICD-10-CM

## 2019-05-02 DIAGNOSIS — Z80.0 FAMILY HISTORY OF COLON CANCER: ICD-10-CM

## 2019-05-02 LAB
CREAT BLD-MCNC: 1.1 MG/DL (ref 0.8–1.5)
REFERENCE LAB,REFLB: NORMAL
TEST DESCRIPTION:,ATST: NORMAL

## 2019-05-02 PROCEDURE — 74011000258 HC RX REV CODE- 258: Performed by: OBSTETRICS & GYNECOLOGY

## 2019-05-02 PROCEDURE — 74011636320 HC RX REV CODE- 636/320: Performed by: OBSTETRICS & GYNECOLOGY

## 2019-05-02 PROCEDURE — 36415 COLL VENOUS BLD VENIPUNCTURE: CPT

## 2019-05-02 PROCEDURE — 74177 CT ABD & PELVIS W/CONTRAST: CPT

## 2019-05-02 PROCEDURE — 82565 ASSAY OF CREATININE: CPT

## 2019-05-02 RX ORDER — SODIUM CHLORIDE 0.9 % (FLUSH) 0.9 %
10 SYRINGE (ML) INJECTION
Status: COMPLETED | OUTPATIENT
Start: 2019-05-02 | End: 2019-05-02

## 2019-05-02 RX ADMIN — Medication 10 ML: at 11:30

## 2019-05-02 RX ADMIN — SODIUM CHLORIDE 100 ML: 900 INJECTION, SOLUTION INTRAVENOUS at 11:30

## 2019-05-02 RX ADMIN — IOPAMIDOL 100 ML: 755 INJECTION, SOLUTION INTRAVENOUS at 11:30

## 2019-05-02 RX ADMIN — DIATRIZOATE MEGLUMINE AND DIATRIZOATE SODIUM 15 ML: 660; 100 LIQUID ORAL; RECTAL at 11:30

## 2019-05-02 NOTE — PROGRESS NOTES
Reviewed CT- fistula present between colon and uterus   Discussed with Dr Guillermina Bermudez with antibiotics fro 3 weeks- pt will  Likely need colon resection +/- hysterectomy  Awaiting phone call from pt

## 2019-05-09 NOTE — H&P (VIEW-ONLY)
Surgery Consult Subjective:  
Ms. Ritchie Sales an 60 y.o. female who was referred for evaluation and treatment of a localized abscess from diverticulits located in the sigmoid colon. This was identified by CT scan. Current symptoms include pain in LLQ. The abscess was drained by  IR previously. Pathology is not diagnostic. She has had symptoms of vaginal bleeding and there is inflamatory changes seen on hysteroscopy Patient Active Problem List  
Diagnosis Code  Nontoxic uninodular goiter E04.1  Diaphragmatic hernia without mention of obstruction or gangrene K44.9  Morbid obesity (HCC) E66.01  
 Pure hypercholesterolemia E78.00  Diverticulitis of intestine with abscess without bleeding K57.80  Family history of colon cancer Z80.0  Postmenopausal bleeding N95.0 Patient Active Problem List  
 Diagnosis Date Noted  Family history of colon cancer 04/23/2019  Postmenopausal bleeding 04/23/2019  Diverticulitis of intestine with abscess without bleeding 11/12/2018  Nontoxic uninodular goiter 06/02/2015  Diaphragmatic hernia without mention of obstruction or gangrene 06/02/2015  Morbid obesity ([address] 06/02/2015  Pure hypercholesterolemia 06/02/2015 Current Outpatient Medications Medication Sig Dispense Refill  metroNIDAZOLE (FLAGYL) 500 mg tablet   0  
 ciprofloxacin, hcl-betaine, (CIPRO XR) 500 mg tablet Take 1 Tab by mouth daily for 21 days. 21 Tab 0  
 ciprofloxacin HCl (CIPRO) 250 mg tablet Take 1 Tab by mouth two (2) times a day for 21 days. 42 Tab 0  
 nystatin-triamcinolone (MYCOLOG II) topical cream Apply  to affected area two (2) times a day. 30 g 0 No Known Allergies Past Medical History:  
Diagnosis Date  Diaphragmatic hernia without mention of obstruction or gangrene 6/2/2015  Diverticulosis  Effusion of ankle and foot joint  Encounter for long-term (current) use of other medications  Helminth infection, unspecified  Morbid obesity ([facility] [address] 6/2/2015  Nontoxic uninodular goiter 6/2/2015  Other specified erythematous condition(695.89)  Overweight(278.02)  PMB (postmenopausal bleeding) Past Surgical History:  
Procedure Laterality Date [address]  HX OTHER SURGICAL Abcess drained on colon after bowel perferation with colonoscopy [address]  IR CHANGE ABSCESS / CYST CATHETER  11/20/2018 Family History Problem Relation Age of Onset  Cancer Mother  Heart Disease Mother  Stroke Father  Diabetes Father  Cancer Brother  Cancer Maternal Grandmother   
     colon cancer Social History Tobacco Use  Smoking status: Never Smoker  Smokeless tobacco: Never Used Substance Use Topics  Alcohol use: Yes Comment: rarely Review of Systems ROS documented by nursing, reviewed with patient. Objective Objective:  
 
Visit Vitals /82 Ht 5' (1.524 m) Wt 206 lb (93.4 kg) LMP  (LMP Unknown) BMI 40.23 kg/m² Gen: Well developed, well nourished 60 y.o. female in no acute distress Head: normocephalic, atraumatic Mouth: Clear, no overt lesions, oral mucosa pink and moist 
Neck: supple, no masses, no adenopathy or carotid bruits, trachea midline Resp: clear to auscultation bilaterally, no wheeze, rhonchi or rales, excursions normal and symmetrical 
Cardio: Regular rate and rhythm, no murmurs, clicks, gallops or rubs, no edema or varicosities Abdomen: soft, nontender, nondistended, normoactive bowel sounds, no hernias, no hepatosplenomegaly Extremities: warm, well-perfused, no tenderness or swelling, normal gait/station Neuro: sensation and strength grossly intact and symmetrical 
Psych: alert and oriented to person, place and time CT Results (most recent): 
Results from Hospital Encounter encounter on 05/02/19 CT ABD PELV W CONT Narrative CT ABDOMEN AND PELVIS WITH INTRAVENOUS CONTRAST DATED 5/2/2019. History: Pyometria following drainage of colon abscess in November 2018. Recent 
vaginal bleeding. Comparison: CT abdomen 3/25/2019 Technique:   Multiple contiguous helical CT images reconstructed at 5 mm 
intervals were obtained from above the diaphragms through the ischial 
tuberosities following oral and 100 cc Isovue-370 without acute complication. All CT scans performed at this facility use one or all of the following: Automated exposure control, adjustment of the mA and/or kVp according to 
patient's size, iterative reconstruction. Findings: 
CT ABDOMEN:   
Limited evaluation of the lung bases and base of the mediastinum demonstrates no 
significant abnormalities. The Liver is homogeneous in attenuation. The spleen is homogeneous in 
attenuation. No contour deforming or enhancing mass lesions are seen of the 
adrenal glands. A stable cystic appearing structure is seen associated with the 
pancreatic body on image 29. No evolving pancreatic ductal dilation or atrophy 
is seen. The gallbladder has been removed. The kidneys enhance symmetrically 
and no evidence of hydronephrosis is seen. The visualized loops of small bowel and colon are normal in caliber. The 
appendix is seen on image 52 without acute abnormality. Inflammatory wall 
thickening is seen in the mid and distal sigmoid colon best appreciated on axial 
image 61. The sigmoid colon closely approximates the uterus at multiple points. Cystic appearing changes are seen in the left pelvis best appreciated on image 69. This consists of a simple appearing cyst seen on image 69 measuring 2.1 cm 
which may represent an adnexal cyst given its location. However, there is an 
abnormal curved cystic structure seen just anterior and inferior to this 
relatively simple cyst best appreciated on axial image 70. This is abnormal in 
that this contains gas. It is uncertain whether this could represent a fistula tract, sequela of pyosalpinx, or represent a small persistent abscess. Given the 
appearance and location, this is favored to represent sequela pyosalpinx. Of 
note is that there is additional small gas within the endometrial canal seen on 
image 70. An abnormal dense \"tract\" is seen extending from the mid sigmoid colon 
to the uterine fundus best appreciated when scrolling from images 61 through 67 
which could represent the presence of a fistulous tract. No free fluid, free 
air, or focal inflammatory changes are seen in the abdomen. No adenopathy is 
seen. The abdominal aorta is unremarkable in appearance. CT PELVIS: 
Inflammatory appearing changes are once again seen of the pelvis as described in 
the CT abdomen portion of this report. .  No pelvic adenopathy is seen. The 
urinary bladder is unremarkable. Impression IMPRESSION:   
1. Inflammatory changes of the pelvis. Specifically, there is an inflamed 
appearance of the mid to distal sigmoid colon. An abnormal gas and fluid 
containing structure is seen in the left pelvis which may represent sequela of 
pyosalpinx. Additional abnormal gas is seen within the endometrial canal. An 
enhancing \"tract\" is seen extending from the mid sigmoid colon to the uterine 
fundus. The possibility of an underlying fistula cannot be excluded. 2. Stable cystic appearing pancreatic lesion. Assessment Assessment/Plan:  
Giselle Floyd is an 60 y.o. female with focal abscess and possible fistula located in the sigmoid colon. We discussed this in detail. I have recommended a robotic, possible open sigmoid colectomy. She is to see Dr Celena Ramirez to discuss if hysterectomy is needed as well I have discussed the risks, benefits and alternatives of surgery. Pt understands and wishes to proceed. Consent obtained I spent 45 minutes in the care of the Giselle Floyd today, over 50% of which was in direct counseling and coordination of care.

## 2019-05-15 RX ORDER — SODIUM CHLORIDE 0.9 % (FLUSH) 0.9 %
5-40 SYRINGE (ML) INJECTION EVERY 8 HOURS
Status: CANCELLED | OUTPATIENT
Start: 2019-05-15

## 2019-05-15 RX ORDER — SODIUM CHLORIDE 0.9 % (FLUSH) 0.9 %
5-40 SYRINGE (ML) INJECTION AS NEEDED
Status: CANCELLED | OUTPATIENT
Start: 2019-05-15

## 2019-05-21 RX ORDER — SODIUM CHLORIDE 0.9 % (FLUSH) 0.9 %
5-40 SYRINGE (ML) INJECTION EVERY 8 HOURS
Status: CANCELLED | OUTPATIENT
Start: 2019-05-21

## 2019-05-21 RX ORDER — SODIUM CHLORIDE 0.9 % (FLUSH) 0.9 %
5-40 SYRINGE (ML) INJECTION AS NEEDED
Status: CANCELLED | OUTPATIENT
Start: 2019-05-21

## 2019-05-22 ENCOUNTER — HOSPITAL ENCOUNTER (OUTPATIENT)
Dept: SURGERY | Age: 59
Discharge: HOME | End: 2019-05-22
Payer: COMMERCIAL

## 2019-05-22 VITALS
HEART RATE: 75 BPM | OXYGEN SATURATION: 98 % | SYSTOLIC BLOOD PRESSURE: 142 MMHG | HEIGHT: 59 IN | WEIGHT: 205.06 LBS | RESPIRATION RATE: 18 BRPM | BODY MASS INDEX: 41.34 KG/M2 | DIASTOLIC BLOOD PRESSURE: 73 MMHG | TEMPERATURE: 97.9 F

## 2019-05-22 LAB
ALBUMIN SERPL-MCNC: 4 G/DL (ref 3.5–5)
ALBUMIN/GLOB SERPL: 1 {RATIO} (ref 1.2–3.5)
ALP SERPL-CCNC: 98 U/L (ref 50–136)
ALT SERPL-CCNC: 17 U/L (ref 12–65)
ANION GAP SERPL CALC-SCNC: 7 MMOL/L (ref 7–16)
AST SERPL-CCNC: 15 U/L (ref 15–37)
BASOPHILS # BLD: 0 K/UL (ref 0–0.2)
BASOPHILS NFR BLD: 1 % (ref 0–2)
BILIRUB SERPL-MCNC: 0.4 MG/DL (ref 0.2–1.1)
BUN SERPL-MCNC: 16 MG/DL (ref 6–23)
CALCIUM SERPL-MCNC: 9.5 MG/DL (ref 8.3–10.4)
CHLORIDE SERPL-SCNC: 104 MMOL/L (ref 98–107)
CO2 SERPL-SCNC: 29 MMOL/L (ref 21–32)
CREAT SERPL-MCNC: 0.98 MG/DL (ref 0.6–1)
DIFFERENTIAL METHOD BLD: NORMAL
EOSINOPHIL # BLD: 0.1 K/UL (ref 0–0.8)
EOSINOPHIL NFR BLD: 2 % (ref 0.5–7.8)
ERYTHROCYTE [DISTWIDTH] IN BLOOD BY AUTOMATED COUNT: 12.8 % (ref 11.9–14.6)
GLOBULIN SER CALC-MCNC: 4.2 G/DL (ref 2.3–3.5)
GLUCOSE SERPL-MCNC: 90 MG/DL (ref 65–100)
HCT VFR BLD AUTO: 42.7 % (ref 35.8–46.3)
HGB BLD-MCNC: 14 G/DL (ref 11.7–15.4)
IMM GRANULOCYTES # BLD AUTO: 0 K/UL (ref 0–0.5)
IMM GRANULOCYTES NFR BLD AUTO: 0 % (ref 0–5)
LYMPHOCYTES # BLD: 2.4 K/UL (ref 0.5–4.6)
LYMPHOCYTES NFR BLD: 32 % (ref 13–44)
MCH RBC QN AUTO: 27.8 PG (ref 26.1–32.9)
MCHC RBC AUTO-ENTMCNC: 32.8 G/DL (ref 31.4–35)
MCV RBC AUTO: 84.9 FL (ref 79.6–97.8)
MONOCYTES # BLD: 0.4 K/UL (ref 0.1–1.3)
MONOCYTES NFR BLD: 6 % (ref 4–12)
NEUTS SEG # BLD: 4.4 K/UL (ref 1.7–8.2)
NEUTS SEG NFR BLD: 59 % (ref 43–78)
NRBC # BLD: 0 K/UL (ref 0–0.2)
PLATELET # BLD AUTO: 346 K/UL (ref 150–450)
PMV BLD AUTO: 9.8 FL (ref 9.4–12.3)
POTASSIUM SERPL-SCNC: 4.1 MMOL/L (ref 3.5–5.1)
PROT SERPL-MCNC: 8.2 G/DL (ref 6.3–8.2)
RBC # BLD AUTO: 5.03 M/UL (ref 4.05–5.2)
SODIUM SERPL-SCNC: 140 MMOL/L (ref 136–145)
WBC # BLD AUTO: 7.4 K/UL (ref 4.3–11.1)

## 2019-05-22 PROCEDURE — 85025 COMPLETE CBC W/AUTO DIFF WBC: CPT

## 2019-05-22 PROCEDURE — 80053 COMPREHEN METABOLIC PANEL: CPT

## 2019-05-22 RX ORDER — RANITIDINE 150 MG/1
150 CAPSULE ORAL 2 TIMES DAILY
COMMUNITY
End: 2019-12-20

## 2019-05-22 NOTE — PERIOP NOTES
Recent Results (from the past 12 hour(s))   CBC WITH AUTOMATED DIFF    Collection Time: 05/22/19 12:00 PM   Result Value Ref Range    WBC 7.4 4.3 - 11.1 K/uL    RBC 5.03 4.05 - 5.2 M/uL    HGB 14.0 11.7 - 15.4 g/dL    HCT 42.7 35.8 - 46.3 %    MCV 84.9 79.6 - 97.8 FL    MCH 27.8 26.1 - 32.9 PG    MCHC 32.8 31.4 - 35.0 g/dL    RDW 12.8 11.9 - 14.6 %    PLATELET 268 304 - 953 K/uL    MPV 9.8 9.4 - 12.3 FL    ABSOLUTE NRBC 0.00 0.0 - 0.2 K/uL    DF AUTOMATED      NEUTROPHILS 59 43 - 78 %    LYMPHOCYTES 32 13 - 44 %    MONOCYTES 6 4.0 - 12.0 %    EOSINOPHILS 2 0.5 - 7.8 %    BASOPHILS 1 0.0 - 2.0 %    IMMATURE GRANULOCYTES 0 0.0 - 5.0 %    ABS. NEUTROPHILS 4.4 1.7 - 8.2 K/UL    ABS. LYMPHOCYTES 2.4 0.5 - 4.6 K/UL    ABS. MONOCYTES 0.4 0.1 - 1.3 K/UL    ABS. EOSINOPHILS 0.1 0.0 - 0.8 K/UL    ABS. BASOPHILS 0.0 0.0 - 0.2 K/UL    ABS. IMM. GRANS. 0.0 0.0 - 0.5 K/UL   METABOLIC PANEL, COMPREHENSIVE    Collection Time: 05/22/19 12:00 PM   Result Value Ref Range    Sodium 140 136 - 145 mmol/L    Potassium 4.1 3.5 - 5.1 mmol/L    Chloride 104 98 - 107 mmol/L    CO2 29 21 - 32 mmol/L    Anion gap 7 7 - 16 mmol/L    Glucose 90 65 - 100 mg/dL    BUN 16 6 - 23 MG/DL    Creatinine 0.98 0.6 - 1.0 MG/DL    GFR est AA >60 >60 ml/min/1.73m2    GFR est non-AA >60 >60 ml/min/1.73m2    Calcium 9.5 8.3 - 10.4 MG/DL    Bilirubin, total 0.4 0.2 - 1.1 MG/DL    ALT (SGPT) 17 12 - 65 U/L    AST (SGOT) 15 15 - 37 U/L    Alk.  phosphatase 98 50 - 136 U/L    Protein, total 8.2 6.3 - 8.2 g/dL    Albumin 4.0 3.5 - 5.0 g/dL    Globulin 4.2 (H) 2.3 - 3.5 g/dL    A-G Ratio 1.0 (L) 1.2 - 3.5     Reviewed

## 2019-05-22 NOTE — PERIOP NOTES
Patient verified name and . Patient provided medical/health information and PTA medications to the best of their ability. TYPE  CASE:3  Order for consent  found in EHR and matches case posting. Labs per surgeon:cbc,bmp,   DOS. Results: pending  Labs per anesthesia protocol: cbc,bmp. Results pending   EKG  :  Not needed at time of PAT. Pt with no hx CAD or DM   Pt states Dr Joan Pedraza is also doing hysterectomy during scheduled procedure. Call placed and message left for Myah at Dr Flora Hallman office for consent and orders. Patient provided with and instructed on education handouts including Guide to Surgery, blood transfusions, pain management, and hand hygiene for the family and community, and [facility] brochure. Hibiclens and instructions given per hospital policy. Instructed patient to continue previous medications as prescribed prior to surgery unless otherwise directed and to take the following medications the day of surgery according to anesthesia guidelines : Augmentin, Zantac . Instructed patient to hold  the following medications: none. Original medication prescription bottles not visualized during patient appointment. Patient teach back successful and patient demonstrates knowledge of instruction.

## 2019-05-29 ENCOUNTER — ANESTHESIA (OUTPATIENT)
Dept: SURGERY | Age: 59
DRG: 329 | End: 2019-05-29
Payer: COMMERCIAL

## 2019-05-29 ENCOUNTER — HOSPITAL ENCOUNTER (INPATIENT)
Age: 59
LOS: 5 days | Discharge: HOME | DRG: 329 | End: 2019-06-03
Attending: SURGERY | Admitting: SURGERY
Payer: COMMERCIAL

## 2019-05-29 ENCOUNTER — ANESTHESIA EVENT (OUTPATIENT)
Dept: SURGERY | Age: 59
DRG: 329 | End: 2019-05-29
Payer: COMMERCIAL

## 2019-05-29 DIAGNOSIS — Z98.890 POST-OPERATIVE STATE: Primary | ICD-10-CM

## 2019-05-29 PROBLEM — K57.20 ABSCESS OF SIGMOID COLON DUE TO DIVERTICULITIS: Status: ACTIVE | Noted: 2019-05-29

## 2019-05-29 LAB
ABO + RH BLD: NORMAL
ALBUMIN SERPL-MCNC: 3.3 G/DL (ref 3.5–5)
ALBUMIN/GLOB SERPL: 1 {RATIO} (ref 1.2–3.5)
ALP SERPL-CCNC: 78 U/L (ref 50–136)
ALT SERPL-CCNC: 19 U/L (ref 12–65)
ANION GAP SERPL CALC-SCNC: 10 MMOL/L (ref 7–16)
AST SERPL-CCNC: 19 U/L (ref 15–37)
BASOPHILS # BLD: 0 K/UL (ref 0–0.2)
BASOPHILS NFR BLD: 0 % (ref 0–2)
BILIRUB SERPL-MCNC: 0.5 MG/DL (ref 0.2–1.1)
BLOOD GROUP ANTIBODIES SERPL: NORMAL
BUN SERPL-MCNC: 15 MG/DL (ref 6–23)
CALCIUM SERPL-MCNC: 8.8 MG/DL (ref 8.3–10.4)
CHLORIDE SERPL-SCNC: 107 MMOL/L (ref 98–107)
CO2 SERPL-SCNC: 24 MMOL/L (ref 21–32)
CREAT SERPL-MCNC: 0.94 MG/DL (ref 0.6–1)
DIFFERENTIAL METHOD BLD: ABNORMAL
EOSINOPHIL # BLD: 0 K/UL (ref 0–0.8)
EOSINOPHIL NFR BLD: 0 % (ref 0.5–7.8)
ERYTHROCYTE [DISTWIDTH] IN BLOOD BY AUTOMATED COUNT: 12.6 % (ref 11.9–14.6)
GLOBULIN SER CALC-MCNC: 3.4 G/DL (ref 2.3–3.5)
GLUCOSE SERPL-MCNC: 174 MG/DL (ref 65–100)
HCT VFR BLD AUTO: 38.5 % (ref 35.8–46.3)
HGB BLD-MCNC: 12.6 G/DL (ref 11.7–15.4)
IMM GRANULOCYTES # BLD AUTO: 0.1 K/UL (ref 0–0.5)
IMM GRANULOCYTES NFR BLD AUTO: 0 % (ref 0–5)
LYMPHOCYTES # BLD: 1.7 K/UL (ref 0.5–4.6)
LYMPHOCYTES NFR BLD: 9 % (ref 13–44)
MCH RBC QN AUTO: 27.7 PG (ref 26.1–32.9)
MCHC RBC AUTO-ENTMCNC: 32.7 G/DL (ref 31.4–35)
MCV RBC AUTO: 84.6 FL (ref 79.6–97.8)
MONOCYTES # BLD: 0.6 K/UL (ref 0.1–1.3)
MONOCYTES NFR BLD: 4 % (ref 4–12)
NEUTS SEG # BLD: 15.3 K/UL (ref 1.7–8.2)
NEUTS SEG NFR BLD: 86 % (ref 43–78)
NRBC # BLD: 0 K/UL (ref 0–0.2)
PLATELET # BLD AUTO: 323 K/UL (ref 150–450)
PMV BLD AUTO: 9.8 FL (ref 9.4–12.3)
POTASSIUM SERPL-SCNC: 3.8 MMOL/L (ref 3.5–5.1)
PROT SERPL-MCNC: 6.7 G/DL (ref 6.3–8.2)
RBC # BLD AUTO: 4.55 M/UL (ref 4.05–5.2)
SODIUM SERPL-SCNC: 141 MMOL/L (ref 136–145)
SPECIMEN EXP DATE BLD: NORMAL
WBC # BLD AUTO: 17.7 K/UL (ref 4.3–11.1)

## 2019-05-29 PROCEDURE — 77030010285 HC STPLR INT PRSTRNG COVD -B: Performed by: SURGERY

## 2019-05-29 PROCEDURE — 77030019908 HC STETH ESOPH SIMS -A: Performed by: ANESTHESIOLOGY

## 2019-05-29 PROCEDURE — 77030034850: Performed by: SURGERY

## 2019-05-29 PROCEDURE — 77030011264 HC ELECTRD BLD EXT COVD -A: Performed by: SURGERY

## 2019-05-29 PROCEDURE — 74011000250 HC RX REV CODE- 250: Performed by: SURGERY

## 2019-05-29 PROCEDURE — 80053 COMPREHEN METABOLIC PANEL: CPT

## 2019-05-29 PROCEDURE — 0UNF0ZZ RELEASE CUL-DE-SAC, OPEN APPROACH: ICD-10-PCS | Performed by: SURGERY

## 2019-05-29 PROCEDURE — 77030002996 HC SUT SLK J&J -A: Performed by: SURGERY

## 2019-05-29 PROCEDURE — 76210000063 HC OR PH I REC FIRST 0.5 HR: Performed by: SURGERY

## 2019-05-29 PROCEDURE — 77030018846 HC SOL IRR STRL H20 ICUM -A: Performed by: SURGERY

## 2019-05-29 PROCEDURE — 77030011808 HC STPLR ENDOSCOPIC J&J -D: Performed by: SURGERY

## 2019-05-29 PROCEDURE — 74011250636 HC RX REV CODE- 250/636: Performed by: ANESTHESIOLOGY

## 2019-05-29 PROCEDURE — 85025 COMPLETE CBC W/AUTO DIFF WBC: CPT

## 2019-05-29 PROCEDURE — 88307 TISSUE EXAM BY PATHOLOGIST: CPT

## 2019-05-29 PROCEDURE — 65270000029 HC RM PRIVATE

## 2019-05-29 PROCEDURE — 77030039425 HC BLD LARYNG TRULITE DISP TELE -A: Performed by: ANESTHESIOLOGY

## 2019-05-29 PROCEDURE — 77030020782 HC GWN BAIR PAWS FLX 3M -B: Performed by: ANESTHESIOLOGY

## 2019-05-29 PROCEDURE — 77030010286 HC STPLR ENDOSC COVD -D: Performed by: SURGERY

## 2019-05-29 PROCEDURE — 77030018832 HC SOL IRR H20 ICUM -A: Performed by: SURGERY

## 2019-05-29 PROCEDURE — 77030037088 HC TUBE ENDOTRACH ORAL NSL COVD-A: Performed by: ANESTHESIOLOGY

## 2019-05-29 PROCEDURE — 77030032490 HC SLV COMPR SCD KNE COVD -B: Performed by: SURGERY

## 2019-05-29 PROCEDURE — 0T788DZ DILATION OF BILATERAL URETERS WITH INTRALUMINAL DEVICE, VIA NATURAL OR ARTIFICIAL OPENING ENDOSCOPIC: ICD-10-PCS | Performed by: UROLOGY

## 2019-05-29 PROCEDURE — 86900 BLOOD TYPING SEROLOGIC ABO: CPT

## 2019-05-29 PROCEDURE — 77030036731 HC STPLR ENDOSC J&J -F: Performed by: SURGERY

## 2019-05-29 PROCEDURE — 0DNW0ZZ RELEASE PERITONEUM, OPEN APPROACH: ICD-10-PCS | Performed by: SURGERY

## 2019-05-29 PROCEDURE — 77030013567 HC DRN WND RESERV BARD -A: Performed by: SURGERY

## 2019-05-29 PROCEDURE — 77030009979 HC RELD STPLR TCR J&J -C: Performed by: SURGERY

## 2019-05-29 PROCEDURE — 77030034849: Performed by: SURGERY

## 2019-05-29 PROCEDURE — 0UT90ZZ RESECTION OF UTERUS, OPEN APPROACH: ICD-10-PCS | Performed by: OBSTETRICS & GYNECOLOGY

## 2019-05-29 PROCEDURE — C1758 CATHETER, URETERAL: HCPCS | Performed by: SURGERY

## 2019-05-29 PROCEDURE — 77030031139 HC SUT VCRL2 J&J -A: Performed by: SURGERY

## 2019-05-29 PROCEDURE — 77030012406 HC DRN WND PENRS BARD -A: Performed by: SURGERY

## 2019-05-29 PROCEDURE — 77030003029 HC SUT VCRL J&J -B: Performed by: SURGERY

## 2019-05-29 PROCEDURE — 77030019927 HC TBNG IRR CYSTO BAXT -A: Performed by: SURGERY

## 2019-05-29 PROCEDURE — 0UT20ZZ RESECTION OF BILATERAL OVARIES, OPEN APPROACH: ICD-10-PCS | Performed by: OBSTETRICS & GYNECOLOGY

## 2019-05-29 PROCEDURE — 77030011278 HC ELECTRD LIG IMPT COVD -F: Performed by: SURGERY

## 2019-05-29 PROCEDURE — 74011000250 HC RX REV CODE- 250

## 2019-05-29 PROCEDURE — 0UT70ZZ RESECTION OF BILATERAL FALLOPIAN TUBES, OPEN APPROACH: ICD-10-PCS | Performed by: OBSTETRICS & GYNECOLOGY

## 2019-05-29 PROCEDURE — C1769 GUIDE WIRE: HCPCS | Performed by: SURGERY

## 2019-05-29 PROCEDURE — 77030018836 HC SOL IRR NACL ICUM -A: Performed by: SURGERY

## 2019-05-29 PROCEDURE — 74011250636 HC RX REV CODE- 250/636: Performed by: SURGERY

## 2019-05-29 PROCEDURE — 76010000174 HC OR TIME 3.5 TO 4 HR INTENSV-TIER 1: Performed by: SURGERY

## 2019-05-29 PROCEDURE — 77030002966 HC SUT PDS J&J -A: Performed by: SURGERY

## 2019-05-29 PROCEDURE — 77030036998 HC STPLR CRV CUT CNTOUR J&J -F: Performed by: SURGERY

## 2019-05-29 PROCEDURE — 74011250636 HC RX REV CODE- 250/636

## 2019-05-29 PROCEDURE — 0DTN0ZZ RESECTION OF SIGMOID COLON, OPEN APPROACH: ICD-10-PCS | Performed by: SURGERY

## 2019-05-29 PROCEDURE — 76060000038 HC ANESTHESIA 3.5 TO 4 HR: Performed by: SURGERY

## 2019-05-29 PROCEDURE — 74011250637 HC RX REV CODE- 250/637: Performed by: ANESTHESIOLOGY

## 2019-05-29 PROCEDURE — 77030008467 HC STPLR SKN COVD -B: Performed by: SURGERY

## 2019-05-29 RX ORDER — CEFAZOLIN SODIUM/WATER 2 G/20 ML
2 SYRINGE (ML) INTRAVENOUS EVERY 8 HOURS
Status: DISCONTINUED | OUTPATIENT
Start: 2019-05-29 | End: 2019-06-03

## 2019-05-29 RX ORDER — PROMETHAZINE HYDROCHLORIDE 25 MG/ML
INJECTION, SOLUTION INTRAMUSCULAR; INTRAVENOUS AS NEEDED
Status: DISCONTINUED | OUTPATIENT
Start: 2019-05-29 | End: 2019-05-29 | Stop reason: HOSPADM

## 2019-05-29 RX ORDER — HYDROMORPHONE HYDROCHLORIDE 1 MG/ML
1 INJECTION, SOLUTION INTRAMUSCULAR; INTRAVENOUS; SUBCUTANEOUS
Status: DISCONTINUED | OUTPATIENT
Start: 2019-05-29 | End: 2019-06-03 | Stop reason: HOSPADM

## 2019-05-29 RX ORDER — DIPHENHYDRAMINE HYDROCHLORIDE 50 MG/ML
25 INJECTION, SOLUTION INTRAMUSCULAR; INTRAVENOUS
Status: DISCONTINUED | OUTPATIENT
Start: 2019-05-29 | End: 2019-06-03 | Stop reason: HOSPADM

## 2019-05-29 RX ORDER — ONDANSETRON 2 MG/ML
INJECTION INTRAMUSCULAR; INTRAVENOUS AS NEEDED
Status: DISCONTINUED | OUTPATIENT
Start: 2019-05-29 | End: 2019-05-29 | Stop reason: HOSPADM

## 2019-05-29 RX ORDER — METRONIDAZOLE 500 MG/100ML
500 INJECTION, SOLUTION INTRAVENOUS
Status: COMPLETED | OUTPATIENT
Start: 2019-05-29 | End: 2019-05-30

## 2019-05-29 RX ORDER — GABAPENTIN 300 MG/1
300 CAPSULE ORAL ONCE
Status: COMPLETED | OUTPATIENT
Start: 2019-05-29 | End: 2019-05-29

## 2019-05-29 RX ORDER — ONDANSETRON 2 MG/ML
4 INJECTION INTRAMUSCULAR; INTRAVENOUS
Status: DISCONTINUED | OUTPATIENT
Start: 2019-05-29 | End: 2019-06-03 | Stop reason: HOSPADM

## 2019-05-29 RX ORDER — FENTANYL CITRATE 50 UG/ML
INJECTION, SOLUTION INTRAMUSCULAR; INTRAVENOUS AS NEEDED
Status: DISCONTINUED | OUTPATIENT
Start: 2019-05-29 | End: 2019-05-29 | Stop reason: HOSPADM

## 2019-05-29 RX ORDER — NALOXONE HYDROCHLORIDE 0.4 MG/ML
0.2 INJECTION, SOLUTION INTRAMUSCULAR; INTRAVENOUS; SUBCUTANEOUS AS NEEDED
Status: DISCONTINUED | OUTPATIENT
Start: 2019-05-29 | End: 2019-05-29 | Stop reason: HOSPADM

## 2019-05-29 RX ORDER — HYDROMORPHONE HYDROCHLORIDE 1 MG/ML
INJECTION, SOLUTION INTRAMUSCULAR; INTRAVENOUS; SUBCUTANEOUS AS NEEDED
Status: DISCONTINUED | OUTPATIENT
Start: 2019-05-29 | End: 2019-05-29 | Stop reason: HOSPADM

## 2019-05-29 RX ORDER — SODIUM CHLORIDE, SODIUM LACTATE, POTASSIUM CHLORIDE, CALCIUM CHLORIDE 600; 310; 30; 20 MG/100ML; MG/100ML; MG/100ML; MG/100ML
125 INJECTION, SOLUTION INTRAVENOUS CONTINUOUS
Status: DISCONTINUED | OUTPATIENT
Start: 2019-05-29 | End: 2019-06-03 | Stop reason: HOSPADM

## 2019-05-29 RX ORDER — SODIUM CHLORIDE, SODIUM LACTATE, POTASSIUM CHLORIDE, CALCIUM CHLORIDE 600; 310; 30; 20 MG/100ML; MG/100ML; MG/100ML; MG/100ML
75 INJECTION, SOLUTION INTRAVENOUS CONTINUOUS
Status: DISCONTINUED | OUTPATIENT
Start: 2019-05-29 | End: 2019-05-29 | Stop reason: HOSPADM

## 2019-05-29 RX ORDER — SCOLOPAMINE TRANSDERMAL SYSTEM 1 MG/1
1 PATCH, EXTENDED RELEASE TRANSDERMAL
Status: DISCONTINUED | OUTPATIENT
Start: 2019-05-29 | End: 2019-05-29 | Stop reason: HOSPADM

## 2019-05-29 RX ORDER — BUPIVACAINE HYDROCHLORIDE AND EPINEPHRINE 2.5; 5 MG/ML; UG/ML
INJECTION, SOLUTION EPIDURAL; INFILTRATION; INTRACAUDAL; PERINEURAL
Status: DISCONTINUED | OUTPATIENT
Start: 2019-05-29 | End: 2019-05-29 | Stop reason: HOSPADM

## 2019-05-29 RX ORDER — METRONIDAZOLE 500 MG/100ML
500 INJECTION, SOLUTION INTRAVENOUS EVERY 12 HOURS
Status: DISCONTINUED | OUTPATIENT
Start: 2019-05-29 | End: 2019-06-03

## 2019-05-29 RX ORDER — ROCURONIUM BROMIDE 10 MG/ML
INJECTION, SOLUTION INTRAVENOUS AS NEEDED
Status: DISCONTINUED | OUTPATIENT
Start: 2019-05-29 | End: 2019-05-29 | Stop reason: HOSPADM

## 2019-05-29 RX ORDER — DEXAMETHASONE SODIUM PHOSPHATE 4 MG/ML
INJECTION, SOLUTION INTRA-ARTICULAR; INTRALESIONAL; INTRAMUSCULAR; INTRAVENOUS; SOFT TISSUE AS NEEDED
Status: DISCONTINUED | OUTPATIENT
Start: 2019-05-29 | End: 2019-05-29 | Stop reason: HOSPADM

## 2019-05-29 RX ORDER — CEFAZOLIN SODIUM/WATER 2 G/20 ML
2 SYRINGE (ML) INTRAVENOUS ONCE
Status: DISCONTINUED | OUTPATIENT
Start: 2019-05-29 | End: 2019-05-29 | Stop reason: HOSPADM

## 2019-05-29 RX ORDER — MIDAZOLAM HYDROCHLORIDE 1 MG/ML
INJECTION, SOLUTION INTRAMUSCULAR; INTRAVENOUS AS NEEDED
Status: DISCONTINUED | OUTPATIENT
Start: 2019-05-29 | End: 2019-05-29 | Stop reason: HOSPADM

## 2019-05-29 RX ORDER — PROPOFOL 10 MG/ML
INJECTION, EMULSION INTRAVENOUS AS NEEDED
Status: DISCONTINUED | OUTPATIENT
Start: 2019-05-29 | End: 2019-05-29 | Stop reason: HOSPADM

## 2019-05-29 RX ORDER — LIDOCAINE HYDROCHLORIDE 10 MG/ML
0.1 INJECTION INFILTRATION; PERINEURAL AS NEEDED
Status: DISCONTINUED | OUTPATIENT
Start: 2019-05-29 | End: 2019-05-29 | Stop reason: HOSPADM

## 2019-05-29 RX ORDER — LIDOCAINE HYDROCHLORIDE 20 MG/ML
INJECTION, SOLUTION EPIDURAL; INFILTRATION; INTRACAUDAL; PERINEURAL AS NEEDED
Status: DISCONTINUED | OUTPATIENT
Start: 2019-05-29 | End: 2019-05-29 | Stop reason: HOSPADM

## 2019-05-29 RX ORDER — SODIUM CHLORIDE 0.9 % (FLUSH) 0.9 %
5-40 SYRINGE (ML) INJECTION EVERY 8 HOURS
Status: DISCONTINUED | OUTPATIENT
Start: 2019-05-29 | End: 2019-05-29 | Stop reason: HOSPADM

## 2019-05-29 RX ORDER — SODIUM CHLORIDE 0.9 % (FLUSH) 0.9 %
5-40 SYRINGE (ML) INJECTION AS NEEDED
Status: DISCONTINUED | OUTPATIENT
Start: 2019-05-29 | End: 2019-05-29 | Stop reason: HOSPADM

## 2019-05-29 RX ORDER — GLYCOPYRROLATE 0.2 MG/ML
INJECTION INTRAMUSCULAR; INTRAVENOUS AS NEEDED
Status: DISCONTINUED | OUTPATIENT
Start: 2019-05-29 | End: 2019-05-29 | Stop reason: HOSPADM

## 2019-05-29 RX ORDER — HYDROMORPHONE HYDROCHLORIDE 2 MG/ML
0.5 INJECTION, SOLUTION INTRAMUSCULAR; INTRAVENOUS; SUBCUTANEOUS
Status: DISCONTINUED | OUTPATIENT
Start: 2019-05-29 | End: 2019-05-29 | Stop reason: HOSPADM

## 2019-05-29 RX ORDER — CEFAZOLIN SODIUM 1 G/3ML
INJECTION, POWDER, FOR SOLUTION INTRAMUSCULAR; INTRAVENOUS AS NEEDED
Status: DISCONTINUED | OUTPATIENT
Start: 2019-05-29 | End: 2019-05-29 | Stop reason: HOSPADM

## 2019-05-29 RX ORDER — NEOSTIGMINE METHYLSULFATE 1 MG/ML
INJECTION INTRAVENOUS AS NEEDED
Status: DISCONTINUED | OUTPATIENT
Start: 2019-05-29 | End: 2019-05-29 | Stop reason: HOSPADM

## 2019-05-29 RX ORDER — EPHEDRINE SULFATE 50 MG/ML
INJECTION, SOLUTION INTRAVENOUS AS NEEDED
Status: DISCONTINUED | OUTPATIENT
Start: 2019-05-29 | End: 2019-05-29 | Stop reason: HOSPADM

## 2019-05-29 RX ORDER — CEFAZOLIN SODIUM IN 0.9 % NACL 2 G/100 ML
2 PLASTIC BAG, INJECTION (ML) INTRAVENOUS EVERY 8 HOURS
Status: DISCONTINUED | OUTPATIENT
Start: 2019-05-29 | End: 2019-05-29

## 2019-05-29 RX ORDER — OXYCODONE AND ACETAMINOPHEN 5; 325 MG/1; MG/1
1 TABLET ORAL AS NEEDED
Status: DISCONTINUED | OUTPATIENT
Start: 2019-05-29 | End: 2019-05-29 | Stop reason: HOSPADM

## 2019-05-29 RX ORDER — APREPITANT 40 MG/1
40 CAPSULE ORAL ONCE
Status: COMPLETED | OUTPATIENT
Start: 2019-05-29 | End: 2019-05-29

## 2019-05-29 RX ORDER — MIDAZOLAM HYDROCHLORIDE 1 MG/ML
2 INJECTION, SOLUTION INTRAMUSCULAR; INTRAVENOUS
Status: DISCONTINUED | OUTPATIENT
Start: 2019-05-29 | End: 2019-05-29 | Stop reason: HOSPADM

## 2019-05-29 RX ADMIN — ONDANSETRON 4 MG: 2 INJECTION INTRAMUSCULAR; INTRAVENOUS at 15:57

## 2019-05-29 RX ADMIN — METRONIDAZOLE 500 MG: 500 INJECTION, SOLUTION INTRAVENOUS at 11:31

## 2019-05-29 RX ADMIN — GABAPENTIN 300 MG: 300 CAPSULE ORAL at 11:31

## 2019-05-29 RX ADMIN — HYDROMORPHONE HYDROCHLORIDE 0.5 MG: 1 INJECTION, SOLUTION INTRAMUSCULAR; INTRAVENOUS; SUBCUTANEOUS at 16:00

## 2019-05-29 RX ADMIN — HYDROMORPHONE HYDROCHLORIDE 1 MG: 1 INJECTION, SOLUTION INTRAMUSCULAR; INTRAVENOUS; SUBCUTANEOUS at 21:34

## 2019-05-29 RX ADMIN — ROCURONIUM BROMIDE 5 MG: 10 INJECTION, SOLUTION INTRAVENOUS at 16:00

## 2019-05-29 RX ADMIN — ROCURONIUM BROMIDE 40 MG: 10 INJECTION, SOLUTION INTRAVENOUS at 13:11

## 2019-05-29 RX ADMIN — FENTANYL CITRATE 50 MCG: 50 INJECTION, SOLUTION INTRAMUSCULAR; INTRAVENOUS at 13:53

## 2019-05-29 RX ADMIN — SODIUM CHLORIDE, SODIUM LACTATE, POTASSIUM CHLORIDE, AND CALCIUM CHLORIDE 75 ML/HR: 600; 310; 30; 20 INJECTION, SOLUTION INTRAVENOUS at 19:16

## 2019-05-29 RX ADMIN — ROCURONIUM BROMIDE 10 MG: 10 INJECTION, SOLUTION INTRAVENOUS at 15:29

## 2019-05-29 RX ADMIN — SODIUM CHLORIDE, SODIUM LACTATE, POTASSIUM CHLORIDE, AND CALCIUM CHLORIDE 75 ML/HR: 600; 310; 30; 20 INJECTION, SOLUTION INTRAVENOUS at 11:29

## 2019-05-29 RX ADMIN — ROCURONIUM BROMIDE 10 MG: 10 INJECTION, SOLUTION INTRAVENOUS at 14:31

## 2019-05-29 RX ADMIN — NEOSTIGMINE METHYLSULFATE 3 MG: 1 INJECTION INTRAVENOUS at 16:21

## 2019-05-29 RX ADMIN — DEXAMETHASONE SODIUM PHOSPHATE 10 MG: 4 INJECTION, SOLUTION INTRA-ARTICULAR; INTRALESIONAL; INTRAMUSCULAR; INTRAVENOUS; SOFT TISSUE at 13:57

## 2019-05-29 RX ADMIN — CEFAZOLIN SODIUM 0.5 G: 1 INJECTION, POWDER, FOR SOLUTION INTRAMUSCULAR; INTRAVENOUS at 13:30

## 2019-05-29 RX ADMIN — BUPIVACAINE HYDROCHLORIDE AND EPINEPHRINE 40 ML: 2.5; 5 INJECTION, SOLUTION EPIDURAL; INFILTRATION; INTRACAUDAL; PERINEURAL at 16:33

## 2019-05-29 RX ADMIN — LIDOCAINE HYDROCHLORIDE 80 MG: 20 INJECTION, SOLUTION EPIDURAL; INFILTRATION; INTRACAUDAL; PERINEURAL at 13:10

## 2019-05-29 RX ADMIN — FAMOTIDINE 20 MG: 10 INJECTION, SOLUTION INTRAVENOUS at 21:34

## 2019-05-29 RX ADMIN — PROMETHAZINE HYDROCHLORIDE 6.25 MG: 25 INJECTION, SOLUTION INTRAMUSCULAR; INTRAVENOUS at 16:04

## 2019-05-29 RX ADMIN — FENTANYL CITRATE 50 MCG: 50 INJECTION, SOLUTION INTRAMUSCULAR; INTRAVENOUS at 13:12

## 2019-05-29 RX ADMIN — CEFAZOLIN SODIUM 0.5 G: 1 INJECTION, POWDER, FOR SOLUTION INTRAMUSCULAR; INTRAVENOUS at 13:25

## 2019-05-29 RX ADMIN — CEFAZOLIN SODIUM 0.5 G: 1 INJECTION, POWDER, FOR SOLUTION INTRAMUSCULAR; INTRAVENOUS at 13:40

## 2019-05-29 RX ADMIN — APREPITANT 40 MG: 40 CAPSULE ORAL at 12:26

## 2019-05-29 RX ADMIN — ROCURONIUM BROMIDE 10 MG: 10 INJECTION, SOLUTION INTRAVENOUS at 13:25

## 2019-05-29 RX ADMIN — EPHEDRINE SULFATE 10 MG: 50 INJECTION, SOLUTION INTRAVENOUS at 13:49

## 2019-05-29 RX ADMIN — FENTANYL CITRATE 50 MCG: 50 INJECTION, SOLUTION INTRAMUSCULAR; INTRAVENOUS at 14:02

## 2019-05-29 RX ADMIN — PROPOFOL 200 MG: 10 INJECTION, EMULSION INTRAVENOUS at 13:10

## 2019-05-29 RX ADMIN — GLYCOPYRROLATE 0.4 MG: 0.2 INJECTION INTRAMUSCULAR; INTRAVENOUS at 16:21

## 2019-05-29 RX ADMIN — Medication 2 G: at 22:49

## 2019-05-29 RX ADMIN — CEFAZOLIN SODIUM 0.5 G: 1 INJECTION, POWDER, FOR SOLUTION INTRAMUSCULAR; INTRAVENOUS at 13:35

## 2019-05-29 RX ADMIN — SODIUM CHLORIDE, SODIUM LACTATE, POTASSIUM CHLORIDE, AND CALCIUM CHLORIDE 125 ML/HR: 600; 310; 30; 20 INJECTION, SOLUTION INTRAVENOUS at 21:33

## 2019-05-29 RX ADMIN — SODIUM CHLORIDE, SODIUM LACTATE, POTASSIUM CHLORIDE, AND CALCIUM CHLORIDE: 600; 310; 30; 20 INJECTION, SOLUTION INTRAVENOUS at 16:00

## 2019-05-29 RX ADMIN — MIDAZOLAM HYDROCHLORIDE 2 MG: 1 INJECTION, SOLUTION INTRAMUSCULAR; INTRAVENOUS at 13:06

## 2019-05-29 RX ADMIN — FENTANYL CITRATE 50 MCG: 50 INJECTION, SOLUTION INTRAMUSCULAR; INTRAVENOUS at 15:00

## 2019-05-29 RX ADMIN — METRONIDAZOLE 500 MG: 500 INJECTION, SOLUTION INTRAVENOUS at 21:34

## 2019-05-29 RX ADMIN — SODIUM CHLORIDE, SODIUM LACTATE, POTASSIUM CHLORIDE, AND CALCIUM CHLORIDE: 600; 310; 30; 20 INJECTION, SOLUTION INTRAVENOUS at 13:51

## 2019-05-29 NOTE — BRIEF OP NOTE
BRIEF OPERATIVE NOTE    Date of Procedure: 5/29/2019   Preoperative Diagnosis: Diverticulitis of large intestine with perforation, unspecified bleeding status [K57.20]  Postoperative Diagnosis: Diverticulitis of large intestine with perforation, unspecified bleeding status [K57.20]    Procedure(s):  OPEN low anterior resection   CYSTOSCOPY BILATERAL STENT PLACEMENT  POSS HYSTERECTOMY ABDOMINAL TOTAL (KISHA) BILATERAL SALPINGO OOPHORECTOMY  Surgeon(s) and Role:  Panel 1:     * Denis Carter MD - Primary  Panel 2:     * Katja Bruner MD - Primary  Panel 3:     * Kimberley Cotton MD - Primary         Surgical Assistant: Annelise Wang CSA    Surgical Staff:  Circ-1: Neri Washington RN  Circ-Relief: Brooks Hernadez RN  Scrub Tech-1: Yoanna Life  Scrub Tech-2: Renata DIAS  Scrub Tech-Relief: Aguilar DIAS  Event Time In Time Out   Incision Start 1340    Incision Close 1614      Anesthesia: General   Estimated Blood Loss: 400ml  Specimens:   ID Type Source Tests Collected by Time Destination   1 : uterus bilateral tubes and ovaries ?  Fresh Uterus with Bilateral Fallopian tubes and Ovaries  Denis Carter MD 3/97/2251 1454 Pathology   2 : sigmoid colon Fresh Colon  Denis Carter MD 1/57/2064 1533 Pathology      Findings: abscess in pelvis involving diverticulitis and uterus    Complications: none  Implants: * No implants in log *

## 2019-05-29 NOTE — ANESTHESIA PREPROCEDURE EVALUATION
Relevant Problems   No relevant active problems       Anesthetic History     PONV          Review of Systems / Medical History  Patient summary reviewed, nursing notes reviewed and pertinent labs reviewed    Pulmonary  Within defined limits                 Neuro/Psych              Cardiovascular                  Exercise tolerance: >4 METS     GI/Hepatic/Renal     GERD: well controlled           Endo/Other        Morbid obesity     Other Findings            Physical Exam    Airway  Mallampati: II  TM Distance: 4 - 6 cm  Neck ROM: normal range of motion   Mouth opening: Normal     Cardiovascular    Rhythm: regular           Dental  No notable dental hx       Pulmonary  Breath sounds clear to auscultation               Abdominal  GI exam deferred       Other Findings            Anesthetic Plan    ASA: 3  Anesthesia type: general            Anesthetic plan and risks discussed with: Patient

## 2019-05-29 NOTE — PERIOP NOTES
TRANSFER - OUT REPORT:    Verbal report given to HERMILA Matta on Milagros Hylton  being transferred to  214 for routine post - op       Report consisted of patients Situation, Background, Assessment and   Recommendations(SBAR). Information from the following report(s) Procedure Summary, Intake/Output, MAR and Recent Results was reviewed with the receiving nurse. Lines:   Peripheral IV 05/29/19 Right Hand (Active)   Site Assessment Clean, dry, & intact 5/29/2019  5:15 PM   Phlebitis Assessment 0 5/29/2019  5:15 PM   Infiltration Assessment 0 5/29/2019  5:15 PM   Dressing Status Clean, dry, & intact 5/29/2019  5:15 PM   Dressing Type Tape;Transparent 5/29/2019  5:15 PM   Hub Color/Line Status Green; Infusing 5/29/2019  5:15 PM        Opportunity for questions and clarification was provided. Patient transported with:   O2 @ 2 liters  Tech    VTE prophylaxis orders have been written for Milagros Hylton.    Patient and family given floor number and nurses name. Family updated re: pt status after security code verified.

## 2019-05-29 NOTE — INTERVAL H&P NOTE
H&P Update: Lamont Call was seen and examined. History and physical has been reviewed. The patient has been examined. There have been no significant clinical changes since the completion of the originally dated History and Physical. 
Patient identified by surgeon; surgical site was confirmed by patient and surgeon. Pt will Have ureteral stents placed by Urology Pt consented for sigmoid colectomy and KISHA/BSO by Dr Nat Soliman

## 2019-05-29 NOTE — ANESTHESIA POSTPROCEDURE EVALUATION
Procedure(s):  OPEN SIGMOID COLECTOMY  CYSTOSCOPY BILATERAL STENT PLACEMENTS- TO GO FIRST  POSS HYSTERECTOMY ABDOMINAL TOTAL (KISHA) BILATERAL SALPINGO OOPHORECTOMY. general    Anesthesia Post Evaluation      Multimodal analgesia: multimodal analgesia used between 6 hours prior to anesthesia start to PACU discharge  Patient location during evaluation: PACU  Patient participation: complete - patient participated  Level of consciousness: awake and alert  Pain management: adequate  Airway patency: patent  Anesthetic complications: no  Cardiovascular status: acceptable  Respiratory status: acceptable  Hydration status: acceptable  Post anesthesia nausea and vomiting:  none      Vitals Value Taken Time   /62 5/29/2019  5:15 PM   Temp 36.7 °C (98 °F) 5/29/2019  5:15 PM   Pulse 85 5/29/2019  5:26 PM   Resp 15 5/29/2019  5:26 PM   SpO2 98 % 5/29/2019  5:26 PM   Vitals shown include unvalidated device data.

## 2019-05-29 NOTE — ANESTHESIA PROCEDURE NOTES
Bilateral TAP Block    Start time: 5/29/2019 4:24 PM  End time: 5/29/2019 4:33 PM  Performed by: Hayden Grissom MD  Authorized by: Hayden Grissom MD       Pre-procedure:    Indications: at surgeon's request and post-op pain management    Preanesthetic Checklist: patient identified, risks and benefits discussed, site marked, timeout performed, anesthesia consent given and patient being monitored      Block Type:   Block Type:  TAP  Laterality:  Left and right  Monitoring:  Standard ASA monitoring, responsive to questions, oxygen, continuous pulse ox, frequent vital sign checks and heart rate  Injection Technique:  Single shot  Procedures: ultrasound guided    Patient Position: seated  Prep: chlorhexidine    Location:  Interscalene  Needle Type:  Stimuplex  Needle Gauge:  22 G  Needle Localization:  Ultrasound guidance    Assessment:  Number of attempts:  1  Injection Assessment:  Incremental injection every 5 mL, negative aspiration for CSF, no paresthesia, ultrasound image on chart, no intravascular symptoms, negative aspiration for blood and local visualized surrounding nerve on ultrasound  Patient tolerance:  Patient tolerated the procedure well with no immediate complications  The relevant block areas were scanned before, during, and after the local anesthetic injections and no gross abnormalities were noted

## 2019-05-30 LAB
ANION GAP SERPL CALC-SCNC: 9 MMOL/L (ref 7–16)
BASOPHILS # BLD: 0 K/UL (ref 0–0.2)
BASOPHILS NFR BLD: 0 % (ref 0–2)
BUN SERPL-MCNC: 15 MG/DL (ref 6–23)
CALCIUM SERPL-MCNC: 8.8 MG/DL (ref 8.3–10.4)
CHLORIDE SERPL-SCNC: 107 MMOL/L (ref 98–107)
CO2 SERPL-SCNC: 24 MMOL/L (ref 21–32)
CREAT SERPL-MCNC: 0.85 MG/DL (ref 0.6–1)
DIFFERENTIAL METHOD BLD: ABNORMAL
EOSINOPHIL # BLD: 0 K/UL (ref 0–0.8)
EOSINOPHIL NFR BLD: 0 % (ref 0.5–7.8)
ERYTHROCYTE [DISTWIDTH] IN BLOOD BY AUTOMATED COUNT: 12.5 % (ref 11.9–14.6)
GLUCOSE SERPL-MCNC: 160 MG/DL (ref 65–100)
HCT VFR BLD AUTO: 35.4 % (ref 35.8–46.3)
HGB BLD-MCNC: 11.7 G/DL (ref 11.7–15.4)
IMM GRANULOCYTES # BLD AUTO: 0.1 K/UL (ref 0–0.5)
IMM GRANULOCYTES NFR BLD AUTO: 0 % (ref 0–5)
LYMPHOCYTES # BLD: 0.6 K/UL (ref 0.5–4.6)
LYMPHOCYTES NFR BLD: 5 % (ref 13–44)
MCH RBC QN AUTO: 27.7 PG (ref 26.1–32.9)
MCHC RBC AUTO-ENTMCNC: 33.1 G/DL (ref 31.4–35)
MCV RBC AUTO: 83.7 FL (ref 79.6–97.8)
MONOCYTES # BLD: 0.8 K/UL (ref 0.1–1.3)
MONOCYTES NFR BLD: 6 % (ref 4–12)
NEUTS SEG # BLD: 11.9 K/UL (ref 1.7–8.2)
NEUTS SEG NFR BLD: 89 % (ref 43–78)
NRBC # BLD: 0 K/UL (ref 0–0.2)
PLATELET # BLD AUTO: 303 K/UL (ref 150–450)
PMV BLD AUTO: 9.8 FL (ref 9.4–12.3)
POTASSIUM SERPL-SCNC: 4 MMOL/L (ref 3.5–5.1)
RBC # BLD AUTO: 4.23 M/UL (ref 4.05–5.2)
SODIUM SERPL-SCNC: 140 MMOL/L (ref 136–145)
WBC # BLD AUTO: 13.3 K/UL (ref 4.3–11.1)

## 2019-05-30 PROCEDURE — 85025 COMPLETE CBC W/AUTO DIFF WBC: CPT

## 2019-05-30 PROCEDURE — 65270000029 HC RM PRIVATE

## 2019-05-30 PROCEDURE — 74011250636 HC RX REV CODE- 250/636

## 2019-05-30 PROCEDURE — 74011000250 HC RX REV CODE- 250: Performed by: SURGERY

## 2019-05-30 PROCEDURE — 80048 BASIC METABOLIC PNL TOTAL CA: CPT

## 2019-05-30 PROCEDURE — 74011250636 HC RX REV CODE- 250/636: Performed by: SURGERY

## 2019-05-30 PROCEDURE — 36415 COLL VENOUS BLD VENIPUNCTURE: CPT

## 2019-05-30 PROCEDURE — 77030020255 HC SOL INJ LR 1000ML BG

## 2019-05-30 RX ORDER — ENOXAPARIN SODIUM 100 MG/ML
40 INJECTION SUBCUTANEOUS EVERY 24 HOURS
Status: DISCONTINUED | OUTPATIENT
Start: 2019-05-30 | End: 2019-05-31

## 2019-05-30 RX ORDER — SODIUM CHLORIDE 900 MG/100ML
INJECTION INTRAVENOUS
Status: DISCONTINUED
Start: 2019-05-30 | End: 2019-05-30 | Stop reason: WASHOUT

## 2019-05-30 RX ORDER — ENOXAPARIN SODIUM 100 MG/ML
INJECTION SUBCUTANEOUS
Status: COMPLETED
Start: 2019-05-30 | End: 2019-05-30

## 2019-05-30 RX ADMIN — Medication 2 G: at 14:10

## 2019-05-30 RX ADMIN — FAMOTIDINE 20 MG: 10 INJECTION, SOLUTION INTRAVENOUS at 08:27

## 2019-05-30 RX ADMIN — HYDROMORPHONE HYDROCHLORIDE 1 MG: 1 INJECTION, SOLUTION INTRAMUSCULAR; INTRAVENOUS; SUBCUTANEOUS at 16:54

## 2019-05-30 RX ADMIN — METRONIDAZOLE 500 MG: 500 INJECTION, SOLUTION INTRAVENOUS at 08:46

## 2019-05-30 RX ADMIN — METRONIDAZOLE 500 MG: 500 INJECTION, SOLUTION INTRAVENOUS at 20:29

## 2019-05-30 RX ADMIN — HYDROMORPHONE HYDROCHLORIDE 1 MG: 1 INJECTION, SOLUTION INTRAMUSCULAR; INTRAVENOUS; SUBCUTANEOUS at 11:11

## 2019-05-30 RX ADMIN — HYDROMORPHONE HYDROCHLORIDE 1 MG: 1 INJECTION, SOLUTION INTRAMUSCULAR; INTRAVENOUS; SUBCUTANEOUS at 03:48

## 2019-05-30 RX ADMIN — HYDROMORPHONE HYDROCHLORIDE 1 MG: 1 INJECTION, SOLUTION INTRAMUSCULAR; INTRAVENOUS; SUBCUTANEOUS at 20:23

## 2019-05-30 RX ADMIN — ENOXAPARIN SODIUM 40 MG: 40 INJECTION SUBCUTANEOUS at 12:31

## 2019-05-30 RX ADMIN — Medication 2 G: at 22:39

## 2019-05-30 RX ADMIN — SODIUM CHLORIDE, SODIUM LACTATE, POTASSIUM CHLORIDE, AND CALCIUM CHLORIDE 125 ML/HR: 600; 310; 30; 20 INJECTION, SOLUTION INTRAVENOUS at 11:18

## 2019-05-30 RX ADMIN — FAMOTIDINE 20 MG: 10 INJECTION, SOLUTION INTRAVENOUS at 20:29

## 2019-05-30 RX ADMIN — Medication 2 G: at 05:52

## 2019-05-30 NOTE — PROGRESS NOTES
Spoke to Ms. Geovany Woods and her niece in room 18. She was independent with ADLs prior to this admission (payroll at a local die [address]), and lives alone. Family member might stay with her, or she might go to their residence initially. No additional discharge needs identified. Care Management Interventions  PCP Verified by CM: Yes  Current Support Network: Own Home, Lives Alone  Confirm Follow Up Transport: Family  Plan discussed with Pt/Family/Caregiver:  Yes

## 2019-05-30 NOTE — ROUTINE PROCESS
END OF SHIFT NOTE:    INTAKE/OUTPUT  05/29 0701 - 05/30 0700  In: 2150 [I.V.:2150]  Out: 1610 [Urine:900; Drains:110]  Voiding: YES  Catheter: YES  Drain:   Girish-Rios Drain 05/29/19 Right Abdomen (Active)   Site Assessment Clean, dry, & intact 5/30/2019  8:26 AM   Dressing Status Clean, dry, & intact 5/30/2019  8:26 AM   Status Draining;Patent; Charged 5/30/2019  8:26 AM   Drainage Color Sanguinous 5/30/2019  8:26 AM   Output (ml) 50 ml 5/30/2019  4:33 AM               Flatus: Patient does not have flatus present. Stool:  0 occurrences. Characteristics:       Emesis: 0 occurrences. Characteristics:        VITAL SIGNS  Patient Vitals for the past 12 hrs:   Temp Pulse Resp BP SpO2   05/30/19 1510 99.1 °F (37.3 °C) 64 14 117/64 98 %   05/30/19 1416 98.2 °F (36.8 °C)       05/30/19 1126 99.1 °F (37.3 °C) 73 18 127/72 97 %   05/30/19 0738 98.2 °F (36.8 °C) 79 16 121/70 96 %       Pain Assessment  Pain Intensity 1: 0 (05/30/19 1758)  Pain Location 1: Abdomen  Pain Intervention(s) 1: Medication (see MAR)  Patient Stated Pain Goal: 0    Ambulating  No    Shift report given to oncoming nurse at the bedside.     Lee Griggs RN

## 2019-05-30 NOTE — OP NOTES
[address] [place] [address]  OPERATIVE REPORT    Name:  Kranthi Rich  MR#:  [id number]  :  1960  ACCOUNT #:  [de-identified]  DATE OF SERVICE:  2019    PREOPERATIVE DIAGNOSES:  1. Diverticular abscess with uterine involvement. 2. Postmenopausal bleeding. POSTOPERATIVE DIAGNOSES:  1. Diverticular abscess with uterine involvement. 2.  Postmenopausal bleeding. PROCEDURES PERFORMED:  Please see Dr. Roseanne Lamb complete dictation. For this portion, lysis of adhesions x20 minutes, total abdominal hysterectomy, bilateral salpingo-oophorectomy. SURGEON:  Dimitris Montoya MD    CO-SURGEON:  Torrie Ribeiro MD        ANESTHESIA:  General.    COMPLICATIONS:  None. ESTIMATED BLOOD LOSS:  300 mL during this portion of the procedure. PATHOLOGY:  Above. DISPOSITION:  Continued surgery with Dr. Amrik Jain for planned bowel resection and re-anastomosis. INDICATION:  This is a patient who is referred after having a couple of episodes of diverticular abscess, the second of which appeared to have involved the uterus along with postmenopausal bleeding. Planned colon surgery was scheduled. Recommendation was to proceed with hysterectomy with presumed infection of the adnexa. Risks in regard to this portion of the procedure were reviewed. Ureteral stents were placed by Urology prior to starting the procedure. FINDINGS:  Dr. Amrik Jain had undertaken lysis of adhesions and largely corrected the pelvic anatomy excepting some continued adhesions of that GYN organs to the damaged colon along with anterior cul-de-sac adhesions and some retroperitoneal fibrosis/induration. The ureter stents were identified and away from the field. The bladder was partially filled at completion with no extravasation. Uterus, tubes and ovaries were removed. PROCEDURE:  The patient was undergoing surgery with Dr. Amrik Jain.   The incision was extended slightly inferiorly, packing completed, Bookwalter was already in place. The retroperitoneal spaces were opened. The ureters were palpably identified and a window made between IPs and ureters. The IPs were grasped with clamp, incised with a ligature followed by a LigaSure proximally. Dissection of the anterior cul-de-sac entailing a significant lysis of adhesions was undertaken. Once the cul-de-sac was identified and a bladder flap was created, the uterine arteries were skeletonized bilaterally. They were grasped high in the uterus with a LigaSure device, cauterized and incised. Continued clamps followed by incision and ligature was undertaken proximally until the cervix was reached. Clamps were placed across the vaginal apex. The specimen was removed. The vaginal apex was closed with Phuc laterally, midline figure-of-eight. Two sutures were placed at locations of bleeding at the anterior cul-de-sac incision. The bladder was partially filled with sterile saline with no extravasation noted. No active bleeding was noted. Dr. Hodges Smoker then proceeded with the remainder of his surgery. Please see complete dictation.       Luisa Driscoll MD      [id number]  D:  05/29/2019 15:13  T:  05/29/2019 19:01  JOB #:  [id number]  CC:  Wily Mcbride MD

## 2019-05-30 NOTE — OP NOTES
[address] [facility]  OPERATIVE REPORT    Name:  Diallo Pineda  MR#:  [id number]  :  1960  ACCOUNT #:  [de-identified]  DATE OF SERVICE:  2019      PREOPERATIVE DIAGNOSIS:  History of diverticulitis and surgeon request for bilateral ureteral catheters. POSTOPERATIVE DIAGNOSIS:  History of diverticulitis and surgeon request for bilateral ureteral catheters. PROCEDURES PERFORMED:  Cystoscopy and bilateral ureteral catheter placement. SURGEON:  Margo Kline DO    ASSISTANT:  None. ANESTHESIA: General.    COMPLICATIONS:  None immediate. SPECIMENS REMOVED:  None. IMPLANTS:  Bilateral ureteral catheters. ESTIMATED BLOOD LOSS:  None. CLINICAL HISTORY:  This is a 69-year-old female with a history of diverticular abscess and possible fistula in the sigmoid colon. Dr. Sharon Herrera is planning a colectomy, Dr. Hiwot Finley is planning a hysterectomy. We will ask him to place bilateral ureteral catheters for the procedure. All risks, benefits and alternatives to the procedure have been discussed and she is willing to proceed at this time. DESCRIPTION OF OPERATIVE PROCEDURE:  The patient's consent was obtained. The patient was brought back to the operating room, at which time, she was placed in the supine position. After the uneventful induction of general anesthesia, she was then placed in a dorsal lithotomy position. Her genital area was prepped and draped and a sterile field applied. A 22-Hong Konger cystoscope was inserted into the urethra and advanced into the bladder under direct visualization. The bladder was systematically surveyed. No abnormalities were seen. A guidewire was passed up the left collecting system without difficulty. A 5-Hong Konger open-ended ureteral catheter was then passed over the wire to the kidney. The wire was then removed as was the cystoscope. A similar procedure was then performed on the right side.   The cystoscope was then removed and a 18-Hong Konger Ford catheter was placed to dependent drainage. The two ureteral catheters were then sewn to the catheter using a 0 silk suture. The patient tolerated this portion of the procedure well. The case was then handed over to Dr. Ree Mcknight.       [address] [facility], DO      [id number]  D:  05/29/2019 14:15  T:  05/29/2019 14:21  JOB #:  [id number]

## 2019-05-30 NOTE — PROGRESS NOTES
PLAN:  Await return of bowel function  Diet NPO with sips of clears  IVFs  SCD/IS/Lovenox/Pepcid for prophylactic measures  OOB and ambulate  Pain control  Monitor labs  Drain care    ASSESSMENT:  Admit Date: 5/29/2019   1 Day Post-Op  Procedure(s):  OPEN SIGMOID COLECTOMY  CYSTOSCOPY BILATERAL STENT PLACEMENTS- TO GO FIRST  POSS HYSTERECTOMY ABDOMINAL TOTAL (KISHA) BILATERAL SALPINGO OOPHORECTOMY    Principal Problem:    Abscess of sigmoid colon due to diverticulitis (5/29/2019)         HPI:Ms. Kajal Han is an 60 y.o. female who was referred for evaluation and treatment of a localized abscess from diverticulits located in the sigmoid colon. This was identified by CT scan. Current symptoms include pain in LLQ. The abscess was drained by  IR previously. Pathology is not diagnostic. She has had symptoms of vaginal bleeding and there is inflamatory changes seen on hysteroscopy    SUBJECTIVE:  Awake in bed, no complaints. AF, VSS. NAD. Labs stable. No N/V. Mukesh with 110mL out since surgery--sanguinous. Intake/Output Summary (Last 24 hours) at 5/30/2019 1026  Last data filed at 5/30/2019 0738  Gross per 24 hour   Intake 3076 ml   Output 1610 ml   Net 1466 ml     OBJECTIVE:  Constitutional: Alert oriented cooperative patient in no acute distress; appears stated age   Visit Vitals  /70   Pulse 79   Temp 98.2 °F (36.8 °C)   Resp 16   Ht 4' 11\" (1.499 m)   Wt 200 lb 12.8 oz (91.1 kg)   LMP  (LMP Unknown)   SpO2 96%   BMI 40.56 kg/m²     Eyes:Sclera are clear. ENMT: no external lesions gross hearing normal; no obvious neck masses, no ear or lip lesions  CV: RRR. Normal perfusion  Resp: No JVD. Breathing is  non-labored; no audible wheezing. GI: soft and non-distended, dressing c/d/i, MUKESH intact     Musculoskeletal: unremarkable with normal function. No embolic signs or cyanosis.    Neuro:  Oriented; moves all 4; no focal deficits  Psychiatric: normal affect and mood, no memory impairment      Patient Vitals for the past 24 hrs:   BP Temp Pulse Resp SpO2 Height Weight   05/30/19 0738 121/70 98.2 °F (36.8 °C) 79 16 96 %     05/30/19 0400 135/77 98.8 °F (37.1 °C) 86 16 98 %     05/29/19 2359 133/72         05/29/19 2344 (!) 153/103 98.4 °F (36.9 °C) 88 16 98 %     05/29/19 1854 137/84 97.6 °F (36.4 °C) 83 16 97 %     05/29/19 1730 131/63  86 16 98 %     05/29/19 1715 131/62 98 °F (36.7 °C) 94 12 99 %     05/29/19 1700 132/61  89 12 99 %     05/29/19 1655 129/60  88 15 98 %     05/29/19 1650 128/62  92 14 99 %     05/29/19 1646 130/65 97.7 °F (36.5 °C) 95 16 98 %     05/29/19 1119 133/72 98.1 °F (36.7 °C) 97 16 98 % 4' 11\" (1.499 m) 200 lb 12.8 oz (91.1 kg)     Labs:    Recent Labs     05/30/19  0415 05/29/19  1724   WBC 13.3* 17.7*   HGB 11.7 12.6    323    141   K 4.0 3.8    107   CO2 24 24   BUN 15 15   CREA 0.85 0.94   * 174*   TBILI  --  0.5   SGOT  --  19   ALT  --  19   AP  --  78       Signed:  Maura Mckinney, NP

## 2019-05-30 NOTE — PROGRESS NOTES
Patient not passing flatus but bowel sounds are active. Patient pain managed with PRN medications. Drsg to abd has some shadowing on lower portion. Out put as followed:    UOP: 650 ml- mckeon is draining and patent. Urine has progressed from bloody to clear with some brownish sediment. MUKESH drain: 50 ml    Hourly rounds performed this shift, needs met at this time. Bed in low/locked position and call light within reach. Will continue to monitor and give bedside report to oncoming nurse.

## 2019-05-30 NOTE — PROGRESS NOTES
Initial visit by  to convey care and concern and encourage patient that  services are available if desired. Provided 's business card for future reference. Chaplains remain available for support.      Jameel Ortega 67  Board Certified

## 2019-05-31 PROCEDURE — 77030020255 HC SOL INJ LR 1000ML BG

## 2019-05-31 PROCEDURE — 74011000250 HC RX REV CODE- 250: Performed by: SURGERY

## 2019-05-31 PROCEDURE — 74011250636 HC RX REV CODE- 250/636: Performed by: SURGERY

## 2019-05-31 PROCEDURE — 74011250637 HC RX REV CODE- 250/637: Performed by: SURGERY

## 2019-05-31 PROCEDURE — 65270000029 HC RM PRIVATE

## 2019-05-31 PROCEDURE — 97110 THERAPEUTIC EXERCISES: CPT

## 2019-05-31 PROCEDURE — 97161 PT EVAL LOW COMPLEX 20 MIN: CPT

## 2019-05-31 PROCEDURE — 74011250636 HC RX REV CODE- 250/636: Performed by: NURSE PRACTITIONER

## 2019-05-31 RX ORDER — ACETAMINOPHEN 10 MG/ML
1000 INJECTION, SOLUTION INTRAVENOUS ONCE
Status: COMPLETED | OUTPATIENT
Start: 2019-05-31 | End: 2019-05-31

## 2019-05-31 RX ORDER — KETOROLAC TROMETHAMINE 30 MG/ML
30 INJECTION, SOLUTION INTRAMUSCULAR; INTRAVENOUS
Status: DISCONTINUED | OUTPATIENT
Start: 2019-05-30 | End: 2019-06-03 | Stop reason: HOSPADM

## 2019-05-31 RX ORDER — ENOXAPARIN SODIUM 100 MG/ML
40 INJECTION SUBCUTANEOUS EVERY 12 HOURS
Status: DISCONTINUED | OUTPATIENT
Start: 2019-05-31 | End: 2019-06-03 | Stop reason: HOSPADM

## 2019-05-31 RX ORDER — ACETAMINOPHEN 10 MG/ML
1000 INJECTION, SOLUTION INTRAVENOUS
Status: DISPENSED | OUTPATIENT
Start: 2019-05-31 | End: 2019-06-01

## 2019-05-31 RX ORDER — ACETAMINOPHEN 325 MG/1
650 TABLET ORAL
Status: DISCONTINUED | OUTPATIENT
Start: 2019-05-31 | End: 2019-06-03 | Stop reason: HOSPADM

## 2019-05-31 RX ADMIN — ACETAMINOPHEN 650 MG: 325 TABLET, FILM COATED ORAL at 17:44

## 2019-05-31 RX ADMIN — FAMOTIDINE 20 MG: 10 INJECTION, SOLUTION INTRAVENOUS at 21:14

## 2019-05-31 RX ADMIN — Medication 2 G: at 14:36

## 2019-05-31 RX ADMIN — ACETAMINOPHEN 1000 MG: 10 INJECTION, SOLUTION INTRAVENOUS at 00:39

## 2019-05-31 RX ADMIN — KETOROLAC TROMETHAMINE 30 MG: 30 INJECTION, SOLUTION INTRAMUSCULAR at 05:39

## 2019-05-31 RX ADMIN — Medication 2 G: at 05:22

## 2019-05-31 RX ADMIN — KETOROLAC TROMETHAMINE 30 MG: 30 INJECTION, SOLUTION INTRAMUSCULAR at 13:09

## 2019-05-31 RX ADMIN — METRONIDAZOLE 500 MG: 500 INJECTION, SOLUTION INTRAVENOUS at 09:15

## 2019-05-31 RX ADMIN — SODIUM CHLORIDE, SODIUM LACTATE, POTASSIUM CHLORIDE, AND CALCIUM CHLORIDE 125 ML/HR: 600; 310; 30; 20 INJECTION, SOLUTION INTRAVENOUS at 17:44

## 2019-05-31 RX ADMIN — ENOXAPARIN SODIUM 40 MG: 40 INJECTION SUBCUTANEOUS at 22:07

## 2019-05-31 RX ADMIN — Medication 2 G: at 21:23

## 2019-05-31 RX ADMIN — METRONIDAZOLE 500 MG: 500 INJECTION, SOLUTION INTRAVENOUS at 21:14

## 2019-05-31 RX ADMIN — ONDANSETRON 4 MG: 2 INJECTION INTRAMUSCULAR; INTRAVENOUS at 00:51

## 2019-05-31 RX ADMIN — FAMOTIDINE 20 MG: 10 INJECTION, SOLUTION INTRAVENOUS at 09:16

## 2019-05-31 RX ADMIN — SODIUM CHLORIDE, SODIUM LACTATE, POTASSIUM CHLORIDE, AND CALCIUM CHLORIDE 125 ML/HR: 600; 310; 30; 20 INJECTION, SOLUTION INTRAVENOUS at 05:41

## 2019-05-31 RX ADMIN — ACETAMINOPHEN 650 MG: 325 TABLET, FILM COATED ORAL at 23:27

## 2019-05-31 RX ADMIN — SODIUM CHLORIDE, SODIUM LACTATE, POTASSIUM CHLORIDE, AND CALCIUM CHLORIDE 125 ML/HR: 600; 310; 30; 20 INJECTION, SOLUTION INTRAVENOUS at 23:27

## 2019-05-31 RX ADMIN — KETOROLAC TROMETHAMINE 30 MG: 30 INJECTION, SOLUTION INTRAMUSCULAR at 21:13

## 2019-05-31 RX ADMIN — ENOXAPARIN SODIUM 40 MG: 40 INJECTION SUBCUTANEOUS at 11:38

## 2019-05-31 NOTE — PROGRESS NOTES
PLAN:  Await return of bowel function  Diet NPO with sips of clears  IVFs  Continue Ancef and Flagyl  SCD/IS/Lovenox/Pepcid for prophylactic measures  OOB and ambulate; Pt requested PT eval  Record drain output; keep MUKESH to bulb suction  Pain/Nausaea control  Monitor labs  Anticipate discharge in 3 days    ASSESSMENT:  Admit Date: 5/29/2019   1 Day Post-Op  Procedure(s):  OPEN SIGMOID COLECTOMY  CYSTOSCOPY BILATERAL STENT PLACEMENTS- TO GO FIRST  POSS HYSTERECTOMY ABDOMINAL TOTAL (KISHA) BILATERAL SALPINGO OOPHORECTOMY    Principal Problem:    Abscess of sigmoid colon due to diverticulitis (5/29/2019)         HPI:Ms. Christopher Cuellar is an 60 y.o. female who was referred for evaluation and treatment of a localized abscess from diverticulits located in the sigmoid colon. This was identified by CT scan. Current symptoms include pain in LLQ. The abscess was drained by  IR previously. Pathology is not diagnostic. She has had symptoms of vaginal bleeding and there is inflamatory changes seen on hysteroscopy    SUBJECTIVE:  Awake in bed, c/o nausea overnight. AF, VSS. NAD. Mukesh not recorded: sanguinous output. Intake/Output Summary (Last 24 hours) at 5/31/2019 0820  Last data filed at 5/31/2019 0741  Gross per 24 hour   Intake    Output 2250 ml   Net -2250 ml     OBJECTIVE:  Constitutional: Alert oriented cooperative patient in no acute distress; appears stated age   Visit Vitals  /72   Pulse 94   Temp 98.9 °F (37.2 °C)   Resp 18   Ht 4' 11\" (1.499 m)   Wt 200 lb 12.8 oz (91.1 kg)   LMP  (LMP Unknown)   SpO2 92%   BMI 40.56 kg/m²     Eyes:Sclera are clear. ENMT: no external lesions gross hearing normal; no obvious neck masses, no ear or lip lesions  CV: RRR. Normal perfusion  Resp: No JVD. Breathing is  non-labored; no audible wheezing. CTAB  GI: soft and non-distended, dressing removed, staples intact and incision is healing appropriately without sign of infection; MUKESH intact. + BS.   Musculoskeletal: unremarkable with normal function. No embolic signs or cyanosis.    Neuro:  Oriented; moves all 4; no focal deficits  Psychiatric: normal affect and mood, no memory impairment      Patient Vitals for the past 24 hrs:   BP Temp Pulse Resp SpO2   05/31/19 0501 131/72 98.9 °F (37.2 °C) 94 18 92 %   05/31/19 0007 133/69 99 °F (37.2 °C) 90 18 93 %   05/30/19 1945 123/80 99.2 °F (37.3 °C) 85 18 97 %   05/30/19 1510 117/64 99.1 °F (37.3 °C) 64 14 98 %   05/30/19 1416  98.2 °F (36.8 °C)      05/30/19 1126 127/72 99.1 °F (37.3 °C) 73 18 97 %     Labs:    Recent Labs     05/30/19  0415 05/29/19  1724   WBC 13.3* 17.7*   HGB 11.7 12.6    323    141   K 4.0 3.8    107   CO2 24 24   BUN 15 15   CREA 0.85 0.94   * 174*   TBILI  --  0.5   SGOT  --  19   ALT  --  19   AP  --  78       Signed:  TANYA Kasper

## 2019-05-31 NOTE — PROGRESS NOTES
Patient requested alternate pain medication, non-narcotic. Contacted Dr. Ariane Albrecht. New orders received.

## 2019-05-31 NOTE — PROGRESS NOTES
Patient was resting peacefully  Jessamine family was present  Spoke briefly with family  No concerns voiced    Will follow closely    Damari Valdes, staff Jameel florian 28, [address] [facility] [address] [place]  /   [email or web address]

## 2019-05-31 NOTE — PROGRESS NOTES
Problem: Mobility Impaired (Adult and Pediatric)  Goal: *Acute Goals and Plan of Care (Insert Text)  Description  LTG:  (1.)Ms. Maikel Low  will move from supine to sit and sit to supine via logrolling  to side in flat bed without siderails with independence within 7 day(s). (2.)Ms. Maikel Low  will transfer from bed to chair and chair to bed with independence using the least restrictive/no device within 7 day(s). (3.)Ms. Maikel Low  will ambulate with independence for 250+ feet with the least restrictive/no device within 7 day(s). Outcome: Progressing Towards Goal     PHYSICAL THERAPY: Initial Assessment and Daily Note 5/31/2019  INPATIENT: PT Visit Days : 1  Payor: Kassie Senior / Plan: [place] [employer] [address] / Product Type: PPO /       NAME/AGE/GENDER: Madyson Nova is a 60 y.o. female   PRIMARY DIAGNOSIS: Diverticulitis of large intestine with perforation, unspecified bleeding status [K57.20]  Abscess of sigmoid colon due to diverticulitis [K57.20]  Abscess of sigmoid colon due to diverticulitis [K57.20] Abscess of sigmoid colon due to diverticulitis   Abscess of sigmoid colon due to diverticulitis    Procedure(s) (LRB):  OPEN SIGMOID COLECTOMY (N/A)  CYSTOSCOPY BILATERAL STENT PLACEMENTS- TO GO FIRST (Bilateral)  POSS HYSTERECTOMY ABDOMINAL TOTAL (KISHA) BILATERAL SALPINGO OOPHORECTOMY (Bilateral)  2 Days Post-Op  ICD-10: Treatment Diagnosis:    Other abnormalities of gait and mobility (R26.89)   Precaution/Allergies:  Demerol [meperidine]      ASSESSMENT:     Ms. Maikel Low presents supine in bed with friend/family at bedside. She rolled to sidelying then transitioned to sit with min A and cueing. She stood with CGA and ambulated 50' in hallway pushing IV pole. Patient is very anxious about moving and has declined in functional mobility. Ms. Maikel Low would benefit from skilled physical therapy (medically necessary) to address her deficits and maximize her function.    Initiated treatment to include exercises below. Patient with good participation. This section established at most recent assessment   PROBLEM LIST (Impairments causing functional limitations):  Decreased Transfer Abilities  Decreased Ambulation Ability/Technique  Decreased Balance  Increased Pain  Decreased Activity Tolerance   INTERVENTIONS PLANNED: (Benefits and precautions of physical therapy have been discussed with the patient.)  Balance Exercise  Bed Mobility  Gait Training  Therapeutic Activites  Therapeutic Exercise/Strengthening  Transfer Training  education      TREATMENT PLAN: Frequency/Duration: 3 times a week for 1 week  Rehabilitation Potential For Stated Goals: Excellent     REHAB RECOMMENDATIONS (at time of discharge pending progress):    Placement: It is my opinion, based on this patient's performance to date, that Ms. Madelyn Cruz may benefit from [address] after discharge due to the functional deficits listed above that are likely to improve with skilled rehabilitation because he/she has an inability to tolerate transportation to an outpatient setting as evidenced by recent surgery and lives alone . Equipment:   None at this time              HISTORY:   History of Present Injury/Illness (Reason for Referral):  Per MD note, Richie Honeycutt was seen and examined. History and physical has been reviewed. The patient has been examined. There have been no significant clinical changes since the completion of the originally dated History and Physical.  Patient identified by surgeon; surgical site was confirmed by patient and surgeon.   Pt will Have ureteral stents placed by Urology  Pt consented for sigmoid colectomy and KISHA/BSO by Dr Naya Maguire"  Past Medical History/Comorbidities:   Ms. Madelyn Cruz  has a past medical history of Diaphragmatic hernia without mention of obstruction or gangrene (6/2/2015), Diverticulosis, Effusion of ankle and foot joint, Encounter for long-term (current) use of other medications, Helminth infection, unspecified, Morbid obesity ([facility] [address] (6/2/2015), Nausea & vomiting, Nontoxic uninodular goiter (6/2/2015), Other specified erythematous condition(695.89), Overweight(278.02), and PMB (postmenopausal bleeding). She also has no past medical history of Difficult intubation, Malignant hyperthermia due to anesthesia, or Pseudocholinesterase deficiency. Ms. Marcos Jacques  has a past surgical history that includes hx cholecystectomy (1998); hx tonsillectomy (1974); ir change abscess / cyst catheter (11/20/2018); and hx other surgical.  Social History/Living Environment:   Home Environment: Private residence  # Steps to Enter: 1  One/Two Story Residence: One story  Living Alone: No  Support Systems: Family member(s), Friends \ neighbors  Patient Expects to be Discharged to[de-identified] Private residence  Current DME Used/Available at Home: None  Prior Level of Function/Work/Activity:  Lives alone, independent in home and community, works, drives, etc.       Number of Personal Factors/Comorbidities that affect the Plan of Care: 1-2: MODERATE COMPLEXITY   EXAMINATION:   Most Recent Physical Functioning:   Gross Assessment:  AROM: Within functional limits  Strength: Within functional limits               Posture:     Balance:  Sitting: Intact  Standing: Intact Bed Mobility:  Rolling: Minimum assistance  Supine to Sit: Minimum assistance  Scooting: Contact guard assistance  Wheelchair Mobility:     Transfers:  Sit to Stand: Contact guard assistance  Stand to Sit: Contact guard assistance  Bed to Chair: Contact guard assistance  Gait:     Base of Support: Widened  Speed/Mile: Slow  Step Length: Right shortened;Left shortened  Gait Abnormalities: Decreased step clearance; Altered arm swing  Distance (ft): 50 Feet (ft)  Assistive Device: Other (comment)(pushing IV pole)  Ambulation - Level of Assistance: Contact guard assistance  Interventions: Safety awareness training;Verbal cues      Body Structures Involved:  Digestive Structures Body Functions Affected: Movement Related  Skin Related  Digestive Activities and Participation Affected: [address], Social and [place]   Number of elements that affect the Plan of Care: 4+: HIGH COMPLEXITY   CLINICAL PRESENTATION:   Presentation: Stable and uncomplicated: LOW COMPLEXITY   CLINICAL DECISION MAKIN [address]? ?6 Clicks? Basic Mobility Inpatient Short Form  How much difficulty does the patient currently have. .. Unable A Lot A Little None   1. Turning over in bed (including adjusting bedclothes, sheets and blankets)? ? 1   ? 2   ? 3   ? 4   2. Sitting down on and standing up from a chair with arms ( e.g., wheelchair, bedside commode, etc.)   ? 1   ? 2   ? 3   ? 4   3. Moving from lying on back to sitting on the side of the bed?   ? 1   ? 2   ? 3   ? 4   How much help from another person does the patient currently need. .. Total A Lot A Little None   4. Moving to and from a bed to a chair (including a wheelchair)? ? 1   ? 2   ? 3   ? 4   5. Need to walk in hospital room? ? 1   ? 2   ? 3   ? 4   6. Climbing 3-5 steps with a railing? ? 1   ? 2   ? 3   ? 4   © , Trustees of [address] [place] [address] [address] [address], under license to [employer]. All rights reserved      Score:  Initial: 18 Most Recent: X (Date: -- )    Interpretation of Tool:  Represents activities that are increasingly more difficult (i.e. Bed mobility, Transfers, Gait). Medical Necessity:     Patient is expected to demonstrate progress in functional technique   to increase independence with   and improve safety during all functional mobility   . Reason for Services/Other Comments:  Patient continues to require skilled intervention due to medical complications and decline in functional mobility   .    Use of outcome tool(s) and clinical judgement create a POC that gives a: Clear prediction of patient's progress: LOW COMPLEXITY            TREATMENT:   (In addition to Assessment/Re-Assessment sessions the following treatments were rendered)   Pre-treatment Symptoms/Complaints:  none. Pain: Initial:   Pain Intensity 1: 3  Pain Location 1: Abdomen  Pain Intervention(s) 1: Repositioned  Post Session:  4     Therapeutic Exercise: ( 8):  Exercises per grid below to improve mobility, strength and coordination. Required minimal visual and verbal cues to promote proper body alignment. Progressed complexity of movement as indicated. DATE: 5/31/19       Ambulation        Hip Flexion X10 AB       Long Arc Quads X15 AB       Knee Squeezes        Ankle DF/PF X15 AB                                        Key:  A=active, AA=active assisted, P=passive, B=bilaterally, R=right, L=left   DF=dorsiflexion, PF=plantarflexion      Braces/Orthotics/Lines/Etc:   IV  O2 Device: Room air  Treatment/Session Assessment:    Response to Treatment:  pleasant and cooperative. Interdisciplinary Collaboration:   Physical Therapist  Registered Nurse  After treatment position/precautions:   Up in chair  Bed/Chair-wheels locked  Call light within reach  RN notified  Family at bedside   Compliance with Program/Exercises: Compliant all of the time, Will assess as treatment progresses  Recommendations/Intent for next treatment session: \"Next visit will focus on advancements to more challenging activities and reduction in assistance provided\".   Total Treatment Duration:  PT Patient Time In/Time Out  Time In: 1325  Time Out: [address], PT, DPT

## 2019-06-01 PROCEDURE — 65270000029 HC RM PRIVATE

## 2019-06-01 PROCEDURE — 74011000250 HC RX REV CODE- 250: Performed by: SURGERY

## 2019-06-01 PROCEDURE — 74011250636 HC RX REV CODE- 250/636: Performed by: SURGERY

## 2019-06-01 PROCEDURE — 77030020255 HC SOL INJ LR 1000ML BG

## 2019-06-01 PROCEDURE — 74011250637 HC RX REV CODE- 250/637: Performed by: SURGERY

## 2019-06-01 RX ADMIN — KETOROLAC TROMETHAMINE 30 MG: 30 INJECTION, SOLUTION INTRAMUSCULAR at 18:42

## 2019-06-01 RX ADMIN — METRONIDAZOLE 500 MG: 500 INJECTION, SOLUTION INTRAVENOUS at 19:56

## 2019-06-01 RX ADMIN — KETOROLAC TROMETHAMINE 30 MG: 30 INJECTION, SOLUTION INTRAMUSCULAR at 08:52

## 2019-06-01 RX ADMIN — ENOXAPARIN SODIUM 40 MG: 40 INJECTION SUBCUTANEOUS at 22:28

## 2019-06-01 RX ADMIN — ACETAMINOPHEN 650 MG: 325 TABLET, FILM COATED ORAL at 21:15

## 2019-06-01 RX ADMIN — ACETAMINOPHEN 650 MG: 325 TABLET, FILM COATED ORAL at 13:42

## 2019-06-01 RX ADMIN — Medication 2 G: at 21:31

## 2019-06-01 RX ADMIN — ENOXAPARIN SODIUM 40 MG: 40 INJECTION SUBCUTANEOUS at 10:42

## 2019-06-01 RX ADMIN — HYDROMORPHONE HYDROCHLORIDE 1 MG: 1 INJECTION, SOLUTION INTRAMUSCULAR; INTRAVENOUS; SUBCUTANEOUS at 22:27

## 2019-06-01 RX ADMIN — ONDANSETRON 4 MG: 2 INJECTION INTRAMUSCULAR; INTRAVENOUS at 22:26

## 2019-06-01 RX ADMIN — SODIUM CHLORIDE, SODIUM LACTATE, POTASSIUM CHLORIDE, AND CALCIUM CHLORIDE 125 ML/HR: 600; 310; 30; 20 INJECTION, SOLUTION INTRAVENOUS at 08:58

## 2019-06-01 RX ADMIN — Medication 2 G: at 13:37

## 2019-06-01 RX ADMIN — Medication 2 G: at 05:46

## 2019-06-01 RX ADMIN — ONDANSETRON 4 MG: 2 INJECTION INTRAMUSCULAR; INTRAVENOUS at 04:20

## 2019-06-01 RX ADMIN — HYDROMORPHONE HYDROCHLORIDE 1 MG: 1 INJECTION, SOLUTION INTRAMUSCULAR; INTRAVENOUS; SUBCUTANEOUS at 04:20

## 2019-06-01 RX ADMIN — ONDANSETRON 4 MG: 2 INJECTION INTRAMUSCULAR; INTRAVENOUS at 10:41

## 2019-06-01 RX ADMIN — FAMOTIDINE 20 MG: 10 INJECTION, SOLUTION INTRAVENOUS at 08:52

## 2019-06-01 RX ADMIN — FAMOTIDINE 20 MG: 10 INJECTION, SOLUTION INTRAVENOUS at 21:00

## 2019-06-01 RX ADMIN — METRONIDAZOLE 500 MG: 500 INJECTION, SOLUTION INTRAVENOUS at 08:52

## 2019-06-01 RX ADMIN — SODIUM CHLORIDE, SODIUM LACTATE, POTASSIUM CHLORIDE, AND CALCIUM CHLORIDE 125 ML/HR: 600; 310; 30; 20 INJECTION, SOLUTION INTRAVENOUS at 19:16

## 2019-06-01 NOTE — PROGRESS NOTES
END OF SHIFT NOTE:    INTAKE/OUTPUT  05/31 0701 - 06/01 0700  In: 1660 [I.V.:1660]  Out: 5890 [Urine:2200; Drains:120]  Voiding: YES  Catheter: NO  Drain:   Girish-Rios Drain 05/29/19 Right Abdomen (Active)   Site Assessment Clean, dry, & intact 6/1/2019  2:57 PM   Dressing Status Clean, dry, & intact 6/1/2019  2:57 PM   Status Patent; Charged;Draining 6/1/2019  2:57 PM   Drainage Color Sanguinous 6/1/2019  2:57 PM   Output (ml) 30 ml 6/1/2019  2:57 PM               Flatus: Patient does have flatus present. Stool:  1 occurrences. Characteristics:  Stool Assessment  Stool Color: Brown  Stool Appearance: Loose, Soft  Stool Amount: Small  Stool Source/Status: Rectum    Emesis: 0 occurrences. Characteristics:        VITAL SIGNS  Patient Vitals for the past 12 hrs:   Temp Pulse Resp BP SpO2   06/01/19 1531 98.8 °F (37.1 °C) 71 18 121/72 97 %   06/01/19 1119 98.2 °F (36.8 °C) 97 18 122/61 97 %   06/01/19 0742 97.8 °F (36.6 °C) 79 18 131/81 97 %       Pain Assessment  Pain Intensity 1: 0 (06/01/19 1206)  Pain Location 1: Abdomen  Pain Intervention(s) 1: Medication (see MAR)  Patient Stated Pain Goal: 0    Ambulating  Yes    Shift report given to oncoming nurse at the bedside.     Lee Griggs RN

## 2019-06-01 NOTE — PROGRESS NOTES
PLAN:  Start Clear Liquid Diet  IVFs  Continue Ancef and Flagyl  SCD/IS/Lovenox/Pepcid for prophylactic measures  OOB and ambulate; PT following  Record drain output; keep MUKESH to bulb suction  Pain/Nausaea control  Monitor labs  Anticipate discharge in 2 days    ASSESSMENT:  Admit Date: 5/29/2019   2 Day Post-Op  Procedure(s):  OPEN SIGMOID COLECTOMY  CYSTOSCOPY BILATERAL STENT PLACEMENTS- TO GO FIRST  POSS HYSTERECTOMY ABDOMINAL TOTAL (KISHA) BILATERAL SALPINGO OOPHORECTOMY    Principal Problem:    Abscess of sigmoid colon due to diverticulitis (5/29/2019)         HPI:Ms. Kajal Han is an 60 y.o. female who was referred for evaluation and treatment of a localized abscess from diverticulits located in the sigmoid colon. This was identified by CT scan. Current symptoms include pain in LLQ. The abscess was drained by  IR previously. Pathology is not diagnostic. She has had symptoms of vaginal bleeding and there is inflamatory changes seen on hysteroscopy    SUBJECTIVE:  Awake in bed, c/o some nausea with dry heaving last pm. Now resolved. AF, VSS. NAD. MUKESH: sanguinous output, 120mL 24hr output. .    Intake/Output Summary (Last 24 hours) at 6/1/2019 1049  Last data filed at 6/1/2019 0742  Gross per 24 hour   Intake 2750 ml   Output 1690 ml   Net 1060 ml     OBJECTIVE:  Constitutional: Alert, cooperative,no acute distress; appears stated age   Visit Vitals  /81   Pulse 79   Temp 97.8 °F (36.6 °C)   Resp 18   Ht 4' 11\" (1.499 m)   Wt 200 lb 12.8 oz (91.1 kg)   LMP  (LMP Unknown)   SpO2 97%   BMI 40.56 kg/m²     Eyes: Sclera are clear. ENMT: no external lesions gross hearing normal; no obvious neck masses, no ear or lip lesions  CV: RRR. Normal perfusion  Resp: No JVD. Breathing is  non-labored; no audible wheezing. CTAB  GI: soft and non-distended, staples c/d/i and incision is healing appropriately without sign of infection; MUKESH intact. + BS. Musculoskeletal: unremarkable with normal function.  No embolic signs or cyanosis.    Neuro:  Oriented; moves all 4; no focal deficits  Psychiatric: normal affect and mood, no memory impairment      Patient Vitals for the past 24 hrs:   BP Temp Pulse Resp SpO2   06/01/19 0742 131/81 97.8 °F (36.6 °C) 79 18 97 %   06/01/19 0345 108/70 98.7 °F (37.1 °C) 81 18 93 %   05/31/19 2327 120/64 98.8 °F (37.1 °C) 92 18 93 %   05/31/19 1955 105/65 98.5 °F (36.9 °C) 92 18 95 %   05/31/19 1503 112/67 97.9 °F (36.6 °C) 88 18 96 %   05/31/19 1111 109/68 98.6 °F (37 °C) 83 18 95 %     Labs:    Recent Labs     05/30/19  0415 05/29/19  1724   WBC 13.3* 17.7*   HGB 11.7 12.6    323    141   K 4.0 3.8    107   CO2 24 24   BUN 15 15   CREA 0.85 0.94   * 174*   TBILI  --  0.5   SGOT  --  19   ALT  --  19   AP  --  78       Signed:  Sandra Reaves, SAMIA

## 2019-06-02 PROCEDURE — 74011000250 HC RX REV CODE- 250: Performed by: SURGERY

## 2019-06-02 PROCEDURE — 74011250636 HC RX REV CODE- 250/636: Performed by: SURGERY

## 2019-06-02 PROCEDURE — 65270000029 HC RM PRIVATE

## 2019-06-02 PROCEDURE — 74011250636 HC RX REV CODE- 250/636: Performed by: NURSE PRACTITIONER

## 2019-06-02 PROCEDURE — 74011000250 HC RX REV CODE- 250: Performed by: NURSE PRACTITIONER

## 2019-06-02 PROCEDURE — 74011250637 HC RX REV CODE- 250/637: Performed by: NURSE PRACTITIONER

## 2019-06-02 PROCEDURE — 74011250637 HC RX REV CODE- 250/637: Performed by: SURGERY

## 2019-06-02 PROCEDURE — 77030020255 HC SOL INJ LR 1000ML BG

## 2019-06-02 RX ORDER — OXYCODONE AND ACETAMINOPHEN 7.5; 325 MG/1; MG/1
1 TABLET ORAL
Status: DISCONTINUED | OUTPATIENT
Start: 2019-06-02 | End: 2019-06-03 | Stop reason: HOSPADM

## 2019-06-02 RX ADMIN — SODIUM CHLORIDE, SODIUM LACTATE, POTASSIUM CHLORIDE, AND CALCIUM CHLORIDE 125 ML/HR: 600; 310; 30; 20 INJECTION, SOLUTION INTRAVENOUS at 20:49

## 2019-06-02 RX ADMIN — FAMOTIDINE 20 MG: 10 INJECTION, SOLUTION INTRAVENOUS at 20:05

## 2019-06-02 RX ADMIN — OXYCODONE HYDROCHLORIDE AND ACETAMINOPHEN 1 TABLET: 7.5; 325 TABLET ORAL at 09:02

## 2019-06-02 RX ADMIN — PROMETHAZINE HYDROCHLORIDE 12.5 MG: 25 INJECTION INTRAMUSCULAR; INTRAVENOUS at 22:25

## 2019-06-02 RX ADMIN — SODIUM CHLORIDE, SODIUM LACTATE, POTASSIUM CHLORIDE, AND CALCIUM CHLORIDE 125 ML/HR: 600; 310; 30; 20 INJECTION, SOLUTION INTRAVENOUS at 02:37

## 2019-06-02 RX ADMIN — PROMETHAZINE HYDROCHLORIDE 12.5 MG: 25 INJECTION INTRAMUSCULAR; INTRAVENOUS at 08:55

## 2019-06-02 RX ADMIN — SODIUM CHLORIDE, SODIUM LACTATE, POTASSIUM CHLORIDE, AND CALCIUM CHLORIDE 125 ML/HR: 600; 310; 30; 20 INJECTION, SOLUTION INTRAVENOUS at 14:03

## 2019-06-02 RX ADMIN — Medication 2 G: at 05:15

## 2019-06-02 RX ADMIN — ONDANSETRON 4 MG: 2 INJECTION INTRAMUSCULAR; INTRAVENOUS at 02:56

## 2019-06-02 RX ADMIN — FAMOTIDINE 20 MG: 10 INJECTION, SOLUTION INTRAVENOUS at 09:02

## 2019-06-02 RX ADMIN — Medication 2 G: at 21:12

## 2019-06-02 RX ADMIN — OXYCODONE HYDROCHLORIDE AND ACETAMINOPHEN 1 TABLET: 7.5; 325 TABLET ORAL at 20:10

## 2019-06-02 RX ADMIN — Medication 2 G: at 14:06

## 2019-06-02 RX ADMIN — ENOXAPARIN SODIUM 40 MG: 40 INJECTION SUBCUTANEOUS at 22:28

## 2019-06-02 RX ADMIN — METRONIDAZOLE 500 MG: 500 INJECTION, SOLUTION INTRAVENOUS at 09:02

## 2019-06-02 RX ADMIN — KETOROLAC TROMETHAMINE 30 MG: 30 INJECTION, SOLUTION INTRAMUSCULAR at 18:25

## 2019-06-02 RX ADMIN — ENOXAPARIN SODIUM 40 MG: 40 INJECTION SUBCUTANEOUS at 13:58

## 2019-06-02 RX ADMIN — METRONIDAZOLE 500 MG: 500 INJECTION, SOLUTION INTRAVENOUS at 20:02

## 2019-06-02 RX ADMIN — KETOROLAC TROMETHAMINE 30 MG: 30 INJECTION, SOLUTION INTRAMUSCULAR at 02:36

## 2019-06-02 RX ADMIN — ACETAMINOPHEN 650 MG: 325 TABLET, FILM COATED ORAL at 18:50

## 2019-06-02 RX ADMIN — OXYCODONE HYDROCHLORIDE AND ACETAMINOPHEN 1 TABLET: 7.5; 325 TABLET ORAL at 13:57

## 2019-06-02 NOTE — PROGRESS NOTES
Patient reported chils and shivering. Temperature was 100.9. Administered 650mg of Tylenol. Contacted Lord Jordan NP. No further orders were received.

## 2019-06-02 NOTE — PROGRESS NOTES
END OF SHIFT NOTE:    INTAKE/OUTPUT  06/01 0701 - 06/02 0700  In: 2750 [I.V.:2750]  Out: 2656 [Urine:1600; Drains:50]  Voiding: YES  Catheter: NO  Drain:   Girish-Rios Drain 05/29/19 Right Abdomen (Active)   Site Assessment Clean, dry, & intact 6/2/2019  1:43 AM   Dressing Status Clean, dry, & intact 6/2/2019  1:43 AM   Status Patent; Charged;Draining 6/2/2019  1:43 AM   Drainage Color Sanguinous 6/2/2019  1:43 AM   Output (ml) 30 ml 6/1/2019  2:57 PM               Flatus: Patient does have flatus present. Stool:  2 occurrences. Characteristics:  Stool Assessment  Stool Color: Brown  Stool Appearance: Loose, Soft  Stool Amount: Small  Stool Source/Status: Rectum    Emesis: 0 occurrences. Characteristics:        VITAL SIGNS  Patient Vitals for the past 12 hrs:   Temp Pulse Resp BP SpO2   06/02/19 0310 98.1 °F (36.7 °C) 65 18 128/70 96 %   06/02/19 0035 98 °F (36.7 °C) 76 17 102/53 95 %   06/01/19 2049 98.2 °F (36.8 °C) 78 18 138/70 97 %       Pain Assessment  Pain Intensity 1: 0 (06/01/19 2318)  Pain Location 1: Abdomen  Pain Intervention(s) 1: Medication (see MAR)  Patient Stated Pain Goal: 0    Ambulating  Yes    Shift report given to oncoming nurse at the bedside.     Leandra Mclean RN

## 2019-06-02 NOTE — PROGRESS NOTES
PLAN:  Trial of Full Liquids  IVFs  Continue Ancef and Flagyl  SCD/IS/Lovenox/Pepcid for prophylactic measures  OOB and ambulate; PT following  Record drain output; keep MUKESH to bulb suction  Pain/[place]ausaea control added percocet and phenergan  Monitor labs  Anticipate discharge in 1-2 days    ASSESSMENT:  Admit Date: 5/29/2019   3 Day Post-Op  Procedure(s):  OPEN SIGMOID COLECTOMY  CYSTOSCOPY BILATERAL STENT PLACEMENTS- TO GO FIRST  POSS HYSTERECTOMY ABDOMINAL TOTAL (KISHA) BILATERAL SALPINGO OOPHORECTOMY    Principal Problem:    Abscess of sigmoid colon due to diverticulitis (5/29/2019)       HPI:Ms. Rosanne James is an 60 y.o. female who was referred for evaluation and treatment of a localized abscess from diverticulits located in the sigmoid colon. This was identified by CT scan. Current symptoms include pain in LLQ. The abscess was drained by  IR previously. Pathology is not diagnostic. She has had symptoms of vaginal bleeding and there is inflamatory changes seen on hysteroscopy    SUBJECTIVE:  Awake in bed, continues to c/o some nausea with dry heaving. Feels like she may not be able to tolerate the broth with clear liquid trays. Zofran does not seem to be helping with nausea. Also states the dilaudid gives her a headache and makes her feel worse. +flatus/+BM. AF, VSS. NAD. MUKESH: sanguinous output, 50mL 24hr output. .    Intake/Output Summary (Last 24 hours) at 6/2/2019 0839  Last data filed at 6/2/2019 0724  Gross per 24 hour   Intake 2666 ml   Output 1630 ml   Net 1036 ml     OBJECTIVE:  Constitutional: Alert, cooperative,no acute distress; appears stated age   Visit Vitals  /70   Pulse 75   Temp 98.8 °F (37.1 °C)   Resp 18   Ht 4' 11\" (1.499 m)   Wt 200 lb 12.8 oz (91.1 kg)   LMP  (LMP Unknown)   SpO2 97%   BMI 40.56 kg/m²     Eyes: Sclera are clear. ENMT: no external lesions gross hearing normal; no obvious neck masses, no ear or lip lesions  CV: RRR. Normal perfusion  Resp: No JVD.   Breathing is  non-labored; no audible wheezing. CTAB  GI: soft and non-distended, staples c/d/i and incision is healing appropriately without sign of infection; MUKESH intact. + BS. Musculoskeletal: unremarkable with normal function. No embolic signs or cyanosis. Neuro:  Oriented; moves all 4; no focal deficits  Psychiatric: normal affect and mood, no memory impairment      Patient Vitals for the past 24 hrs:   BP Temp Pulse Resp SpO2   06/02/19 0724 122/70 98.8 °F (37.1 °C) 75 18 97 %   06/02/19 0310 128/70 98.1 °F (36.7 °C) 65 18 96 %   06/02/19 0035 102/53 98 °F (36.7 °C) 76 17 95 %   06/01/19 2049 138/70 98.2 °F (36.8 °C) 78 18 97 %   06/01/19 1531 121/72 98.8 °F (37.1 °C) 71 18 97 %   06/01/19 1119 122/61 98.2 °F (36.8 °C) 97 18 97 %       Signed:  Paul Ortega NP     I have personally performed a face-to-face diagnostic evaluation and management  service on this patient. I have independently seen the patient. I have independently obtained the above history from the patient/family. I have independently examined the patient with above findings. I have independently reviewed data/labs for this patient and developed the above plan of care.     [place] [id number] [facility]

## 2019-06-03 VITALS
TEMPERATURE: 98.7 F | OXYGEN SATURATION: 96 % | HEART RATE: 95 BPM | BODY MASS INDEX: 40.48 KG/M2 | DIASTOLIC BLOOD PRESSURE: 64 MMHG | WEIGHT: 200.8 LBS | RESPIRATION RATE: 18 BRPM | SYSTOLIC BLOOD PRESSURE: 114 MMHG | HEIGHT: 59 IN

## 2019-06-03 PROBLEM — Z98.890 POST-OPERATIVE STATE: Status: ACTIVE | Noted: 2019-06-03

## 2019-06-03 PROCEDURE — 74011250637 HC RX REV CODE- 250/637: Performed by: SURGERY

## 2019-06-03 PROCEDURE — 74011250636 HC RX REV CODE- 250/636: Performed by: SURGERY

## 2019-06-03 PROCEDURE — 97530 THERAPEUTIC ACTIVITIES: CPT

## 2019-06-03 PROCEDURE — 77030036554

## 2019-06-03 PROCEDURE — 74011000250 HC RX REV CODE- 250: Performed by: SURGERY

## 2019-06-03 PROCEDURE — 74011250637 HC RX REV CODE- 250/637: Performed by: NURSE PRACTITIONER

## 2019-06-03 RX ORDER — ONDANSETRON 4 MG/1
4 TABLET, ORALLY DISINTEGRATING ORAL
Qty: 30 TAB | Refills: 0 | Status: SHIPPED | OUTPATIENT
Start: 2019-06-03 | End: 2019-12-20 | Stop reason: ALTCHOICE

## 2019-06-03 RX ORDER — HYDROCODONE BITARTRATE AND ACETAMINOPHEN 5; 325 MG/1; MG/1
1 TABLET ORAL
Qty: 40 TAB | Refills: 0 | Status: SHIPPED | OUTPATIENT
Start: 2019-06-03 | End: 2019-06-06

## 2019-06-03 RX ADMIN — FAMOTIDINE 20 MG: 10 INJECTION, SOLUTION INTRAVENOUS at 09:17

## 2019-06-03 RX ADMIN — KETOROLAC TROMETHAMINE 30 MG: 30 INJECTION, SOLUTION INTRAMUSCULAR at 09:17

## 2019-06-03 RX ADMIN — ENOXAPARIN SODIUM 40 MG: 40 INJECTION SUBCUTANEOUS at 11:02

## 2019-06-03 RX ADMIN — Medication 2 G: at 05:21

## 2019-06-03 RX ADMIN — KETOROLAC TROMETHAMINE 30 MG: 30 INJECTION, SOLUTION INTRAMUSCULAR at 04:37

## 2019-06-03 RX ADMIN — OXYCODONE HYDROCHLORIDE AND ACETAMINOPHEN 1 TABLET: 7.5; 325 TABLET ORAL at 14:49

## 2019-06-03 RX ADMIN — ACETAMINOPHEN 650 MG: 325 TABLET, FILM COATED ORAL at 11:01

## 2019-06-03 NOTE — DISCHARGE SUMMARY
[address], [address] [facility]  [phone]   Discharge Summary     Milvia Salas  MRN: [id number]     : 1960     Age: 60 y.o. Admit date: 2019     Discharge date: 6/3/2019  Attending Physician: Milvia Santiago MD  Primary Discharge Diagnosis:   Principal Problem:    Abscess of sigmoid colon due to diverticulitis (2019)    Active Problems:    Post-operative state (6/3/2019)      Primary Operations or Procedures Performed :  Procedure(s):  OPEN SIGMOID COLECTOMY  CYSTOSCOPY BILATERAL STENT PLACEMENTS- TO GO FIRST  POSS HYSTERECTOMY ABDOMINAL TOTAL (KISHA) BILATERAL SALPINGO OOPHORECTOMY     Brief History and Reason for Admission: Milvia Salas was admitted with the following history of present illness. Ms. Orellana is HU 13 y.o. female who was referred for evaluation and treatment of a localized abscess from diverticulits located in the sigmoid colon. This was identified by CT scan. Current symptoms include pain in LLQ. The abscess was drained by Merari Green previously. Pathology is not diagnostic. She has had symptoms of vaginal bleeding and there is inflamatory changes seen on hysteroscopy      Hospital Course: The patient had a sigmoid colectomy on 19. She had a hysterectomy at the same time by Dr. Magdalena Honeycutt. She had an uneventful post operative course. Bowel function returned on POD #3. She was able to tolerate soft foods at discharge. She is ready for discharge home. Pathology     DIAGNOSIS   A: UTERUS AND BILATERAL TUBES AND OVARIES: MILD CHRONIC CERVICITIS WITH A NABOTHIAN CYST. BENIGN ENDOMETRIAL POLYP. ATROPHIC ENDOMETRIUM. FOCAL ADENOMYOSIS. INTRAMURAL LEIOMYOMA. RIGHT OVARY WITH SEROUS CYST. RIGHT AND LEFT FALLOPIAN TUBES WITH NO DIAGNOSTIC ABNORMALITIES. THE LEFT OVARY IS NOT IDENTIFIED. B: SIGMOID COLON: SEGMENT OF LARGE INTESTINE WITH DIVERTICULOSIS.  FOCAL ACUTE AND CHRONIC SEROSITIS WITH FIBROUS ADHESIONS. BENIGN LYMPH NODE. Condition at Discharge: Good    Discharge Medications:   Current Discharge Medication List      START taking these medications    Details   HYDROcodone-acetaminophen (NORCO) 5-325 mg per tablet Take 1 Tab by mouth every four (4) hours as needed for Pain for up to 3 days. Max Daily Amount: 6 Tabs. Qty: 40 Tab, Refills: 0    Associated Diagnoses: Post-operative state      ondansetron (ZOFRAN ODT) 4 mg disintegrating tablet Take 1 Tab by mouth every six (6) hours as needed for Nausea (and vomiting). Qty: 30 Tab, Refills: 0         CONTINUE these medications which have NOT CHANGED    Details   raNITIdine hcl 150 mg capsule Take 150 mg by mouth two (2) times a day. STOP taking these medications       amoxicillin-clavulanate (AUGMENTIN) 875-125 mg per tablet Comments:   Reason for Stopping:                 Disposition: Home    Discharge Instructions/Follow-up Care:      MD Instructions:     Follow-up with Tan Russo NP in 1 week. Keep incisions clean and dry, may remain uncovered. Do not apply lotions, creams or ointments to incisions.     Diet - GI soft  Activity - ambulate - as tolerated - no heavy lifting >10lb. May shower - no tub baths or soaking/submerging.     No driving while taking narcotics. Do not drink alcohol while taking narcotics.   Resume other home medications.      If problems or questions arise, please call our office at [phone].     Greater than 30 minutes were spent discharging the patient    Signed:  Michael Robertson NP   6/3/2019  8:52 AM

## 2019-06-03 NOTE — PROGRESS NOTES
Problem: Mobility Impaired (Adult and Pediatric)  Goal: *Acute Goals and Plan of Care (Insert Text)  Description  LTG:  (1.)Ms. Serafin Dang  will move from supine to sit and sit to supine via logrolling  to side in flat bed without siderails with independence within 7 day(s). (2.)Ms. Serafin Dang  will transfer from bed to chair and chair to bed with independence using the least restrictive/no device within 7 day(s). Met 6/3/2019   (3.)Ms. Serafin Dang  will ambulate with independence for 250+ feet with the least restrictive/no device within 7 day(s). Outcome: Progressing Towards Goal     PHYSICAL THERAPY: Daily Note 6/3/2019  INPATIENT: PT Visit Days : 2  Payor: Aleksandr Bruner / Plan: [place] [employer] [facility] / Product Type: PPO /       NAME/AGE/GENDER: Bri Brooks is a 60 y.o. female   PRIMARY DIAGNOSIS: Diverticulitis of large intestine with perforation, unspecified bleeding status [K57.20]  Abscess of sigmoid colon due to diverticulitis [K57.20]  Abscess of sigmoid colon due to diverticulitis [K57.20] Abscess of sigmoid colon due to diverticulitis   Abscess of sigmoid colon due to diverticulitis    Procedure(s) (LRB):  OPEN SIGMOID COLECTOMY (N/A)  CYSTOSCOPY BILATERAL STENT PLACEMENTS- TO GO FIRST (Bilateral)  POSS HYSTERECTOMY ABDOMINAL TOTAL (KISAH) BILATERAL SALPINGO OOPHORECTOMY (Bilateral)  5 Days Post-Op  ICD-10: Treatment Diagnosis:    · Other abnormalities of gait and mobility (R26.89)   Precaution/Allergies:  Demerol [meperidine]      ASSESSMENT:     Ms. Serafin Dang presents supine in bed with daughter at bedside. She rolled to sidelying then transitioned to sit with mod A without use of siderail and cueing. She stood with independence and ambulated 500' in hallway splinting abdomen with hands. Patient demonstrated progress with sit to stand and ambulation, needs continued work on bed mobility.   Ms. Serafin Dang would benefit from skilled physical therapy (medically necessary) to address her deficits and maximize her function. This section established at most recent assessment   PROBLEM LIST (Impairments causing functional limitations):  1. Decreased Transfer Abilities  2. Decreased Ambulation Ability/Technique  3. Decreased Balance  4. Increased Pain  5. Decreased Activity Tolerance   INTERVENTIONS PLANNED: (Benefits and precautions of physical therapy have been discussed with the patient.)  1. Balance Exercise  2. Bed Mobility  3. Gait Training  4. Therapeutic Activites  5. Therapeutic Exercise/Strengthening  6. Transfer Training  7. education      TREATMENT PLAN: Frequency/Duration: 3 times a week for 1 week  Rehabilitation Potential For Stated Goals: Excellent     REHAB RECOMMENDATIONS (at time of discharge pending progress):    Placement: It is my opinion, based on this patient's performance to date, that Ms. Ryan Raza may benefit from [address] after discharge due to the functional deficits listed above that are likely to improve with skilled rehabilitation because he/she has an inability to tolerate transportation to an outpatient setting as evidenced by recent surgery and lives alone . Equipment:    None at this time              HISTORY:   History of Present Injury/Illness (Reason for Referral):  Per MD note, Dakota Hooks was seen and examined. History and physical has been reviewed. The patient has been examined. There have been no significant clinical changes since the completion of the originally dated History and Physical.  Patient identified by surgeon; surgical site was confirmed by patient and surgeon.   Pt will Have ureteral stents placed by Urology  Pt consented for sigmoid colectomy and KISHA/BSO by Dr Haylee Herbert"  Past Medical History/Comorbidities:   Ms. Ryan Raza  has a past medical history of Diaphragmatic hernia without mention of obstruction or gangrene (6/2/2015), Diverticulosis, Effusion of ankle and foot joint, Encounter for long-term (current) use of other medications, Helminth infection, unspecified, Morbid obesity ([facility] [address] (6/2/2015), Nausea & vomiting, Nontoxic uninodular goiter (6/2/2015), Other specified erythematous condition(695.89), Overweight(278.02), and PMB (postmenopausal bleeding). She also has no past medical history of Difficult intubation, Malignant hyperthermia due to anesthesia, or Pseudocholinesterase deficiency. Ms. Ksota Ann  has a past surgical history that includes hx cholecystectomy (1998); hx tonsillectomy (1974); ir change abscess / cyst catheter (11/20/2018); and hx other surgical.  Social History/Living Environment:   Home Environment: Private residence  # Steps to Enter: 1  One/Two Story Residence: One story  Living Alone: No  Support Systems: Family member(s), Friends \ neighbors  Patient Expects to be Discharged to[de-identified] Private residence  Current DME Used/Available at Home: None  Prior Level of Function/Work/Activity:  Lives alone, independent in home and community, works, drives, etc.       Number of Personal Factors/Comorbidities that affect the Plan of Care: 1-2: MODERATE COMPLEXITY   EXAMINATION:   Most Recent Physical Functioning:   Gross Assessment:  AROM: Within functional limits  Strength: Within functional limits               Posture:     Balance:  Sitting: Intact  Standing: Intact Bed Mobility:  Supine to Sit: Moderate assistance  Scooting: Independent  Duration: 20 Minutes  Wheelchair Mobility:     Transfers:  Sit to Stand: Independent  Stand to Sit: Independent  Bed to Chair: Independent  Gait:     Gait Abnormalities: Altered arm swing;Decreased step clearance  Distance (ft): 500 Feet (ft)  Ambulation - Level of Assistance: Independent  Interventions: Safety awareness training      Body Structures Involved:  1. Digestive Structures Body Functions Affected:  1. Movement Related  2. Skin Related  3. Digestive Activities and Participation Affected:  1. Mobility  2. Self Care  3. Domestic Life  4.  Community, Social and [place]   Number of elements that affect the Plan of Care: 4+: HIGH COMPLEXITY   CLINICAL PRESENTATION:   Presentation: Stable and uncomplicated: LOW COMPLEXITY   CLINICAL DECISION MAKING:   [facility] AM-PAC 6 Clicks   Basic Mobility Inpatient Short Form  How much difficulty does the patient currently have. .. Unable A Lot A Little None   1. Turning over in bed (including adjusting bedclothes, sheets and blankets)? ? 1   ? 2   ? 3   ? 4   2. Sitting down on and standing up from a chair with arms ( e.g., wheelchair, bedside commode, etc.)   ? 1   ? 2   ? 3   ? 4   3. Moving from lying on back to sitting on the side of the bed?   ? 1   ? 2   ? 3   ? 4   How much help from another person does the patient currently need. .. Total A Lot A Little None   4. Moving to and from a bed to a chair (including a wheelchair)? ? 1   ? 2   ? 3   ? 4   5. Need to walk in hospital room? ? 1   ? 2   ? 3   ? 4   6. Climbing 3-5 steps with a railing? ? 1   ? 2   ? 3   ? 4   © 2007, Trustees of [facility] [employer], under license to [employer]. All rights reserved      Score:  Initial: 18 Most Recent: X (Date: -- )    Interpretation of Tool:  Represents activities that are increasingly more difficult (i.e. Bed mobility, Transfers, Gait). Medical Necessity:     · Patient is expected to demonstrate progress in functional technique  ·  to increase independence with   and improve safety during all functional mobility   · . Reason for Services/Other Comments:  · Patient continues to require skilled intervention due to medical complications and decline in functional mobility   · . Use of outcome tool(s) and clinical judgement create a POC that gives a: Clear prediction of patient's progress: LOW COMPLEXITY            TREATMENT:   (In addition to Assessment/Re-Assessment sessions the following treatments were rendered)   Pre-treatment Symptoms/Complaints:  none.   Pain: Initial:   Pain Intensity 1: 6  Pain Location 1: Abdomen  Pain Intervention(s) 1: Repositioned  Post Session:  6     Therapeutic Activity: 15   ):  Therapeutic activities including Bed transfers, Ambulation on level ground and education in body mechanics for bed mobility to improve mobility, strength and balance. Required minimal Safety awareness training to promote correct body mechanics with bed mobility. Lina Landers DATE: 5/31/19       Ambulation        Hip Flexion X10 AB       Long Arc Quads X15 AB       Knee Squeezes        Ankle DF/PF X15 AB                                        Key:  A=active, AA=active assisted, P=passive, B=bilaterally, R=right, L=left   DF=dorsiflexion, PF=plantarflexion      Braces/Orthotics/Lines/Etc:   · O2 Device: Room air  Treatment/Session Assessment:    · Response to Treatment:  pleasant and cooperative. · Interdisciplinary Collaboration:   o Physical Therapist  o Registered Nurse  · After treatment position/precautions:   o Supine in bed  o Bed/Chair-wheels locked  o Bed in low position  o Call light within reach  o RN notified  o Family at bedside   · Compliance with Program/Exercises: Compliant all of the time, Will assess as treatment progresses  · Recommendations/Intent for next treatment session: \"Next visit will focus on advancements to more challenging activities and reduction in assistance provided\".   Total Treatment Duration:  PT Patient Time In/Time Out  Time In: 1424  Time Out: 333 Kishore Meneses PT, DPT

## 2019-06-03 NOTE — PROGRESS NOTES
Discharge instructions and prescriptions given and explained to pt. Pt verbalized understanding. Iv left in place primary care nurse will remove after medications. . Pt to be discharged home.

## 2019-06-03 NOTE — DISCHARGE INSTRUCTIONS
Discharge Instructions/Follow-up Plans:   MD Instructions:     Follow-up with Jessika Anthony NP in 1 week. Keep incisions clean and dry, may remain uncovered. Do not apply lotions, creams or ointments to incisions.     Diet - GI soft  Activity - ambulate - as tolerated - no heavy lifting >10lb. May shower - no tub baths or soaking/submerging.     No driving while taking narcotics. Do not drink alcohol while taking narcotics. Resume other home medications.      If problems or questions arise, please call our office at [phone].     Greater than 30 minutes were spent discharging the patient          Patient Education        Diverticulitis: Care Instructions  Your Care Instructions    Diverticulitis occurs when pouches form in the wall of the colon and become inflamed or infected. It can be very painful. Doctors aren't sure what causes diverticulitis. There is no proof that foods such as nuts, seeds, or berries cause it or make it worse. A low-fiber diet may cause the colon to work harder to push stool forward. Pouches may form because of this extra work. It may be hard to think about healthy eating while you're in pain. But as you recover, you might think about how you can use healthy eating for overall better health. Healthy eating may help you avoid future attacks. Follow-up care is a key part of your treatment and safety. Be sure to make and go to all appointments, and call your doctor if you are having problems. It's also a good idea to know your test results and keep a list of the medicines you take. How can you care for yourself at home? · Drink plenty of fluids, enough so that your urine is light yellow or clear like water. If you have kidney, heart, or liver disease and have to limit fluids, talk with your doctor before you increase the amount of fluids you drink. · Stick to liquids or a bland diet (plain rice, bananas, dry toast or crackers, applesauce) until you are feeling better.  Then you can return to regular foods and gradually increase the amount of fiber in your diet. · Use a heating pad set on low on your belly to relieve mild cramps and pain. · Get extra rest until you are feeling better. · Be safe with medicines. Read and follow all instructions on the label. ? If the doctor gave you a prescription medicine for pain, take it as prescribed. ? If you are not taking a prescription pain medicine, ask your doctor if you can take an over-the-counter medicine. · If your doctor prescribed antibiotics, take them as directed. Do not stop taking them just because you feel better. You need to take the full course of antibiotics. To prevent future attacks of diverticulitis  · Avoid constipation:  ? Include fruits, vegetables, beans, and whole grains in your diet each day. These foods are high in fiber. ? Drink plenty of fluids, enough so that your urine is light yellow or clear like water. If you have kidney, heart, or liver disease and have to limit fluids, talk with your doctor before you increase the amount of fluids you drink. ? Get some exercise every day. Build up slowly to 30 to 60 minutes a day on 5 or more days of the week. ? Take a fiber supplement, such as Citrucel or Metamucil, every day if needed. Read and follow all instructions on the label. ? Schedule time each day for a bowel movement. Having a daily routine may help. Take your time and do not strain when having a bowel movement. When should you call for help? Call your doctor now or seek immediate medical care if:    · You have a fever.     · You are vomiting.     · You have new or worse belly pain.     · You cannot pass stools or gas.    Watch closely for changes in your health, and be sure to contact your doctor if you have any problems. Where can you learn more? Go to http://ulisses-bertin.info/. Enter H901 in the search box to learn more about \"Diverticulitis: Care Instructions. \"  Current as of: March 27, 2018  Content Version: 11.9  © 1043-1070 [employer]. Care instructions adapted under license by [employer] (which disclaims liability or warranty for this information). If you have questions about a medical condition or this instruction, always ask your healthcare professional. [address] any warranty or liability for your use of this information. DISCHARGE SUMMARY from Nurse    PATIENT INSTRUCTIONS:    After general anesthesia or intravenous sedation, for 24 hours or while taking prescription Narcotics:  · Limit your activities  · Do not drive and operate hazardous machinery  · Do not make important personal or business decisions  · Do  not drink alcoholic beverages  · If you have not urinated within 8 hours after discharge, please contact your surgeon on call. Report the following to your surgeon:  · Excessive pain, swelling, redness or odor of or around the surgical area  · Temperature over 100.5  · Nausea and vomiting lasting longer than 4 hours or if unable to take medications  · Any signs of decreased circulation or nerve impairment to extremity: change in color, persistent  numbness, tingling, coldness or increase pain  · Any questions    What to do at Home:  Recommended activity: Activity as tolerated,     If you experience any of the following symptoms fever greater then 100.5, pain unrelieved by medication, increase in shortness of breath, please follow up with primary care doctor. *  Please give a list of your current medications to your Primary Care Provider. *  Please update this list whenever your medications are discontinued, doses are      changed, or new medications (including over-the-counter products) are added. *  Please carry medication information at all times in case of emergency situations.     These are general instructions for a healthy lifestyle:    No smoking/ No tobacco products/ Avoid exposure to second hand smoke  Surgeon General's Warning:  Quitting smoking now greatly reduces serious risk to your health. Obesity, smoking, and sedentary lifestyle greatly increases your risk for illness    A healthy diet, regular physical exercise & weight monitoring are important for maintaining a healthy lifestyle    You may be retaining fluid if you have a history of heart failure or if you experience any of the following symptoms:  Weight gain of 3 pounds or more overnight or 5 pounds in a week, increased swelling in our hands or feet or shortness of breath while lying flat in bed. Please call your doctor as soon as you notice any of these symptoms; do not wait until your next office visit. Recognize signs and symptoms of STROKE:    F-face looks uneven    A-arms unable to move or move unevenly    S-speech slurred or non-existent    T-time-call 911 as soon as signs and symptoms begin-DO NOT go       Back to bed or wait to see if you get better-TIME IS BRAIN. Warning Signs of HEART ATTACK     Call 911 if you have these symptoms:   Chest discomfort. Most heart attacks involve discomfort in the center of the chest that lasts more than a few minutes, or that goes away and comes back. It can feel like uncomfortable pressure, squeezing, fullness, or pain.  Discomfort in other areas of the upper body. Symptoms can include pain or discomfort in one or both arms, the back, neck, jaw, or stomach.  Shortness of breath with or without chest discomfort.  Other signs may include breaking out in a cold sweat, nausea, or lightheadedness. Don't wait more than five minutes to call 911 - MINUTES MATTER! Fast action can save your life. Calling 911 is almost always the fastest way to get lifesaving treatment. Emergency Medical Services staff can begin treatment when they arrive -- up to an hour sooner than if someone gets to the hospital by car. The discharge information has been reviewed with the patient.   The patient verbalized understanding. Discharge medications reviewed with the patient and appropriate educational materials and side effects teaching were provided.   ___________________________________________________________________________________________________________________________________

## 2019-06-03 NOTE — PROGRESS NOTES
Visit with patient to build rapport with .       Lara Ellis,  Staff   C: [phone]  /  [email or web address]

## 2019-06-03 NOTE — PROGRESS NOTES
END OF SHIFT NOTE:    INTAKE/OUTPUT  06/02 0701 - 06/03 0700  In: 2666 [I.V.:2666]  Out: 1229 [Urine:925; Drains:90]  Voiding: YES  Catheter: NO  Drain:   Girish-Rios Drain 05/29/19 Right Abdomen (Active)   Site Assessment Clean, dry, & intact 6/3/2019  1:20 AM   Dressing Status Clean, dry, & intact 6/3/2019  1:20 AM   Status Patent; Charged;Draining 6/3/2019  1:20 AM   Drainage Color Sanguinous 6/3/2019  1:20 AM   Output (ml) 40 ml 6/2/2019 11:41 PM               Flatus: Patient does not have flatus present. Stool:  0 occurrences. Characteristics:  Stool Assessment  Stool Color: Brown  Stool Appearance: Loose, Soft  Stool Amount: Small  Stool Source/Status: Rectum    Emesis: 0 occurrences. Characteristics:        VITAL SIGNS  Patient Vitals for the past 12 hrs:   Temp Pulse Resp BP SpO2   06/03/19 0355 97.1 °F (36.2 °C) 92 18 134/67 96 %   06/02/19 2341 98.5 °F (36.9 °C) 94 18 132/73 96 %   06/02/19 1947 98 °F (36.7 °C) 97 18 125/59 96 %       Pain Assessment  Pain Intensity 1: 0 (06/02/19 2300)  Pain Location 1: Abdomen  Pain Intervention(s) 1: Rest  Patient Stated Pain Goal: 0    Ambulating  Yes    Shift report given to oncoming nurse at the bedside.     La Jimenez RN

## 2019-06-17 NOTE — OP NOTES
[address] [place] [address]  OPERATIVE REPORT    Name:  Alba Morgan  MR#:  [id number]  :  1960  ACCOUNT #:  [de-identified]  DATE OF SERVICE:  2019    PREOPERATIVE DIAGNOSIS:  Diverticulitis of the large intestine with perforation, abscess and fistula tract of the uterus. POSTOPERATIVE DIAGNOSIS:  Diverticulitis of the large intestine with perforation, abscess and fistula tract of the uterus. PROCEDURE PERFORMED:  Open low anterior resection with end-to-end anastomosis. SURGEON:  Jerald Moore MD    ASSISTANT:  Norberto Butcher, certified first assist.    ANESTHESIA:  general.    COMPLICATIONS:  None. SPECIMENS REMOVED:  Distal sigmoid and proximal rectum. DRAINS:  Single 19-Martiniquais round MUKESH drain in the pelvis. CONDITION ON COMPLETION:  Stable. IMPLANTS:  None. ESTIMATED BLOOD LOSS:  250 mL. PROCEDURE IN DETAIL:  The patient is a 39-year-old white female who had been having signs and symptoms of diverticulitis for many months. The patient developed recurrent infectious symptoms including fevers, chills, and left lower quadrant pain. CT scans had been performed showing evidence of a contained perforation with abscess between the sigmoid colon and uterus. The patient also experienced vaginal bleeding. There was a high suspicion for fistula tract between the abscess cavity and the uterine body. Dr. Julienne Vera was consulted for possible hysterectomy at the same procedure. This was performed. Please see his dictated notes for those portions of the procedure. Risks, benefits, and alternatives to surgical resection, partial colectomy, low anterior resection, possible colostomy were all discussed in detail with the patient prior to surgery. She understood the risks and wished to proceed and appropriate consent was given for the procedure. PROCEDURE IN DETAIL:  The patient was taken to the operating room.   She underwent general endotracheal anesthetic without difficulty. IV antibiotics were administered at the time of anesthetic. Time-out was performed identifying the patient and the procedure to be performed. The patient was placed in modified lithotomy position with Darwin stirrups. The abdomen and perineum were prepped and draped in sterile fashion. A lower midline incision was made with a 10 blade from the umbilicus to pubis. Careful opening of the abdominal cavity in the midline was performed with electrocautery Bovie. Upon entrance, inspection was performed. As seen on CT scan, the patient had a hardened, inflamed segment of distal sigmoid colon with abscess cavity connecting to the posterior uterine wall. Dr. Lavern Chahal performed the hysterectomy and bilateral salpingo-oophorectomy first.  Once this was completed, the colectomy portion was performed. A Bookwalter retractor was placed for proper visualization. Mobilization of the left colon was performed along the white line of Toldt from splenic flexure distally to the peritoneal reflection. Colon was completely mobilized upon its mesentery from lateral to medial.  The inflamed segment was identified, diverticulum above the segment were not visualized. A linear WES 75 stapler was used to divide the descending colon at approximately the left colon and sigmoid junction. Mesentery was then carefully taken down with LigaSure device and clamp and 2-0 silk ties. This was continued into the pelvis past the inflamed segment to the peritoneal reflection. Here, the colon was again divided using a Contour stapling device. The segment was removed and sent to Pathology for further review. The abscess cavity which had been present was carefully broken up, irrigated and debrided. Hemostasis in all dissection planes was assured. The left and right ureter were identified with the help of the ureteral stents which were placed by Dr. Maxx Ambrose at the beginning of the procedure.   Please see his dictated notes for that portion. .  Once adequate length was gained with mobilization of the descending colon, this was prepared for EEA anastomosis. The staple line was resected sharply with scissors and blood supply was checked and was good. The segment was serially dilated with metallic dilators of 31 mm. A 29 mm EEA stapler was chosen for the anastomosis and anvil was placed within the segment and secured with pursestring. The anus was dilated to two fingerbreadths and the stapler carefully advanced to the staple line and the pelvis with the help of palpation and visualization. The spike was advanced just anterior to the staple line and connected to the anvil. The stapler was closed and fired creating a 29 mm end-to-end anastomosis. This anastomosis was then checked for any sign of leak by filling the pelvis with saline and insufflating per anus with the rigid sigmoidoscope. There was good insufflation of the segment past the staple line with no sign of any air leak. Copious irrigation of the pelvis and dissection planes was performed and hemostasis was assured. A 19 round MUKESH drain was brought into the lateral stab incision on the right side and positioned in the pelvis. This was secured to the skin with silk suture and placed to bulb suction. Midline fascia was closed with running loop 0 PDS suture. Skin and subcu were irrigated and closed with skin staples and sterile gauze and tape dressing applied. The patient tolerated the procedure well. The ureteral stents were removed and were intact. Ford catheter was left in place. She was awakened, extubated, and taken to recovery in stable condition. She tolerated the procedure well.       John Spence MD      [id number]  D:  06/17/2019 7:55  T:  06/17/2019 7:59  JOB #:  [id number]

## 2019-07-03 ENCOUNTER — HOSPITAL ENCOUNTER (OUTPATIENT)
Dept: LAB | Age: 59
Discharge: HOME | End: 2019-07-03
Payer: COMMERCIAL

## 2019-07-03 DIAGNOSIS — L24.A9 WOUND DRAINAGE: ICD-10-CM

## 2019-07-03 DIAGNOSIS — Z90.49 S/P COLON RESECTION: ICD-10-CM

## 2019-07-03 PROCEDURE — 87205 SMEAR GRAM STAIN: CPT

## 2019-07-06 LAB
BACTERIA SPEC CULT: NORMAL
GRAM STN SPEC: NORMAL
GRAM STN SPEC: NORMAL
SERVICE CMNT-IMP: NORMAL

## 2019-12-20 PROBLEM — K57.90 DIVERTICULOSIS OF INTESTINE WITHOUT BLEEDING: Status: ACTIVE | Noted: 2019-12-20

## 2019-12-20 PROBLEM — K57.80 DIVERTICULITIS OF INTESTINE WITH ABSCESS WITHOUT BLEEDING: Status: RESOLVED | Noted: 2018-11-12 | Resolved: 2019-12-20

## 2019-12-20 PROBLEM — Z98.890 POST-OPERATIVE STATE: Status: RESOLVED | Noted: 2019-06-03 | Resolved: 2019-12-20

## 2019-12-20 PROBLEM — N95.0 POSTMENOPAUSAL BLEEDING: Status: RESOLVED | Noted: 2019-04-23 | Resolved: 2019-12-20

## 2019-12-20 PROBLEM — K57.20 ABSCESS OF SIGMOID COLON DUE TO DIVERTICULITIS: Status: RESOLVED | Noted: 2019-05-29 | Resolved: 2019-12-20

## 2022-03-18 PROBLEM — K57.90 DIVERTICULOSIS OF INTESTINE WITHOUT BLEEDING: Status: ACTIVE | Noted: 2019-12-20

## 2022-03-19 PROBLEM — Z80.0 FAMILY HISTORY OF COLON CANCER: Status: ACTIVE | Noted: 2019-04-23

## 2022-06-15 ENCOUNTER — TELEPHONE (OUTPATIENT)
Dept: INTERNAL MEDICINE CLINIC | Facility: CLINIC | Age: 62
End: 2022-06-15

## 2022-06-15 ENCOUNTER — TELEMEDICINE (OUTPATIENT)
Dept: INTERNAL MEDICINE CLINIC | Facility: CLINIC | Age: 62
End: 2022-06-15
Payer: COMMERCIAL

## 2022-06-15 DIAGNOSIS — R21 RASH AND NONSPECIFIC SKIN ERUPTION: Primary | ICD-10-CM

## 2022-06-15 PROCEDURE — 99213 OFFICE O/P EST LOW 20 MIN: CPT | Performed by: NURSE PRACTITIONER

## 2022-06-15 RX ORDER — METHYLPREDNISOLONE 4 MG/1
TABLET ORAL
Qty: 1 KIT | Refills: 0 | Status: SHIPPED | OUTPATIENT
Start: 2022-06-15 | End: 2022-06-21

## 2022-06-15 ASSESSMENT — ENCOUNTER SYMPTOMS
NAUSEA: 0
VOMITING: 0
COLOR CHANGE: 1
TROUBLE SWALLOWING: 0

## 2022-06-15 NOTE — PROGRESS NOTES
Marilyn Cartwright (:  1960) is a Established patient, here for evaluation of the following:    Assessment & Plan   Below is the assessment and plan developed based on review of pertinent history, physical exam, labs, studies, and medications. 1. Rash and nonspecific skin eruption  Appears hive-like in nature with no active drainage or red streaking noted on video visit. She denies any fevers or chills or other infective symptoms. Try meds below as well as using Claritin daily, keeping able to keep dressing on it for the entire duration of her steroid pack. Knows to call the office for any new or worsening symptoms. - methylPREDNISolone (MEDROL DOSEPACK) 4 MG tablet; Take by mouth. Dispense: 1 kit; Refill: 0  - silver sulfADIAZINE (SILVADENE) 1 % cream; Apply thick layer twice daily  Dispense: 50 g; Refill: 0        Subjective   This patient presents via virtual visit for evaluation of a rash that has been ongoing to her chest wall for the past 2 weeks. Notes that she sat out in the sun for the first time this season and developed an itchy and irritated rash that has not abated despite using hydrocortisone cream.  She denies any new products that she can think of but admits that she might have used a lotion or sunscreen that she is not familiar with. She denies any new medications. She denies fevers or chills, dysphagia, wheezing. She reports there is no drainage or warmth from the rash. She notes that her clothes are irritating to it and she is itching it in her sleep. Review of Systems   Constitutional: Negative for chills, fatigue and fever. HENT: Negative for trouble swallowing. Gastrointestinal: Negative for nausea and vomiting. Skin: Positive for color change and rash. All other systems reviewed and are negative. Objective   Patient-Reported Vitals  No data recorded     Physical Exam  Constitutional:       General: She is not in acute distress.      Appearance: She is not ill-appearing, toxic-appearing or diaphoretic. Pulmonary:      Comments: Speaks in complete sentences. No distress. Skin:     Findings: Rash present. Rash is macular, papular and urticarial. Rash is not crusting. [INSTRUCTIONS:  \"[x]\" Indicates a positive item  \"[]\" Indicates a negative item  -- DELETE ALL ITEMS NOT EXAMINED]    Constitutional: [x] Appears well-developed and well-nourished [x] No apparent distress      [] Abnormal -     Mental status: [x] Alert and awake  [x] Oriented to person/place/time [x] Able to follow commands    [] Abnormal -     Eyes:   EOM    [x]  Normal    [] Abnormal -   Sclera  [x]  Normal    [] Abnormal -          Discharge [x]  None visible   [] Abnormal -     HENT: [x] Normocephalic, atraumatic  [] Abnormal -   [x] Mouth/Throat: Mucous membranes are moist    External Ears [x] Normal  [] Abnormal -    Neck: [x] No visualized mass [] Abnormal -     Pulmonary/Chest: [x] Respiratory effort normal   [x] No visualized signs of difficulty breathing or respiratory distress        [] Abnormal -      Musculoskeletal:   [x] Normal gait with no signs of ataxia         [x] Normal range of motion of neck        [] Abnormal -     Neurological:        [x] No Facial Asymmetry (Cranial nerve 7 motor function) (limited exam due to video visit)          [x] No gaze palsy        [] Abnormal -          Skin:        [x] No significant exanthematous lesions or discoloration noted on facial skin         [] Abnormal -            Psychiatric:       [x] Normal Affect [] Abnormal -        [x] No Hallucinations    Other pertinent observable physical exam findings:-             On this date 6/15/2022 I have spent 20 minutes reviewing previous notes, test results and face to face (virtual) with the patient discussing the diagnosis and importance of compliance with the treatment plan as well as documenting on the day of the visit.     Peter Tomlinson was evaluated through a synchronous (real-time) audio-video encounter. The patient (or guardian if applicable) is aware that this is a billable service, which includes applicable co-pays. This Virtual Visit was conducted with patient's (and/or legal guardian's) consent. The visit was conducted pursuant to the emergency declaration under the [facility], [address] [place] [address] and the [employer] General Act. Patient identification was verified, and a caregiver was present when appropriate. The patient was located at Other: her work. Provider was located at [facility] (Appt Dept): Margot Branham [address] [place] [address].         --Adrianna Sinclair NP, APRN - CNP

## 2022-06-15 NOTE — TELEPHONE ENCOUNTER
----- Message from Axel Harper sent at 6/15/2022 11:21 AM EDT -----  Subject: Message to Provider    QUESTIONS  Information for Provider? patient has sunburn on her chest/neckline. The   sunburn has resolved but is red and inflamed and itchy. Is she able to get   any steroid> she has been using Neosporin and cortisone, but not really   helping. Please review to send prescription. If appt is needed she would   be able to do virtual only  ---------------------------------------------------------------------------  --------------  CALL BACK INFO  What is the best way for the office to contact you? OK to leave message on   voicemail  Preferred Call Back Phone Number? [phone]  ---------------------------------------------------------------------------  --------------  SCRIPT ANSWERS  Relationship to Patient?  Self

## 2022-06-15 NOTE — PATIENT INSTRUCTIONS
Patient Education        Sunburn: Care Instructions  Overview  A sunburn is skin damage from the sun's ultraviolet (UV) rays. Most sunburns cause mild pain and redness but affect only the outer layer of skin. These are called first-degree burns. The red skin might hurt when you touch it. Thesesunburns are mild and can usually be treated at home. Skin that is red and painful and that swells up and blisters may mean that deep skin layers and nerve endings have been damaged. These are second-degree burns. This type of sunburn is usually more painful and takes longer to heal.  Follow-up care is a key part of your treatment and safety. Be sure to make and go to all appointments, and call your doctor if you are having problems. It's also a good idea to know your test results and keep alist of the medicines you take. How can you care for yourself at home?  Use cool cloths on the sunburned areas.  Apply soothing lotions with aloe vera to sunburned areas.  Try anti-inflammatory medicine (like ibuprofen) to reduce pain, swelling, and fever. Read and follow all instructions on the label.  Don't try to stop peeling after a sunburn. It's part of the healing process.  Protect your skin by using sunscreen, hats, and loose-fitting, tightly-woven clothes. Caring for blisters  Blisters often heal on their own.  Don't try to break blisters. Leave them alone.  Don't remove the flap of skin covering the blister unless it tears or gets dirty or pus forms under it. The flap protects the healing skin underneath.  If a blister ruptures, gently clean it with mild soap and water and loosely cover it. Put a thin layer of petroleum jelly on the bandage before you put the bandage on. This will keep it from sticking to the blister. When should you call for help? Call your doctor now or seek immediate medical care if:     You have signs of needing more fluids.  You have sunken eyes, a dry mouth, and you pass only a little urine.      You have signs of infection, such as:  ? Increased pain, swelling, warmth, or redness. ? Red streaks leading from the area. ? Pus draining from the area. ? A fever. Watch closely for changes in your health, and be sure to contact your doctor if:     You do not get better as expected. Where can you learn more? Go to https://Cliqpepiceweb.[employer]. org and sign in to your [employer] account. Enter F820 in the [facility] [place] box to learn more about \"Sunburn: Care Instructions. \"     If you do not have an account, please click on the \"Sign Up Now\" link. Current as of: July 1, 2021               Content Version: 13.2  © 2006-2022 [employer]. Care instructions adapted under license by [facility] ([facility]). If you have questions about a medical condition or this instruction, always ask your healthcare professional. [address] any warranty or liability for your use of this information.

## 2022-08-08 ENCOUNTER — OFFICE VISIT (OUTPATIENT)
Dept: INTERNAL MEDICINE CLINIC | Facility: CLINIC | Age: 62
End: 2022-08-08
Payer: COMMERCIAL

## 2022-08-08 VITALS
WEIGHT: 218 LBS | BODY MASS INDEX: 43.95 KG/M2 | HEIGHT: 59 IN | TEMPERATURE: 98 F | HEART RATE: 80 BPM | DIASTOLIC BLOOD PRESSURE: 72 MMHG | SYSTOLIC BLOOD PRESSURE: 143 MMHG | RESPIRATION RATE: 18 BRPM | OXYGEN SATURATION: 97 %

## 2022-08-08 DIAGNOSIS — R22.41 LOCALIZED SWELLING OF RIGHT LOWER LEG: ICD-10-CM

## 2022-08-08 DIAGNOSIS — L71.9 ROSACEA: Primary | ICD-10-CM

## 2022-08-08 PROCEDURE — 99213 OFFICE O/P EST LOW 20 MIN: CPT | Performed by: NURSE PRACTITIONER

## 2022-08-08 RX ORDER — METRONIDAZOLE 7.5 MG/G
GEL TOPICAL
Qty: 1 EACH | Refills: 0 | Status: SHIPPED | OUTPATIENT
Start: 2022-08-08

## 2022-08-08 SDOH — ECONOMIC STABILITY: FOOD INSECURITY: WITHIN THE PAST 12 MONTHS, YOU WORRIED THAT YOUR FOOD WOULD RUN OUT BEFORE YOU GOT MONEY TO BUY MORE.: NEVER TRUE

## 2022-08-08 SDOH — ECONOMIC STABILITY: FOOD INSECURITY: WITHIN THE PAST 12 MONTHS, THE FOOD YOU BOUGHT JUST DIDN'T LAST AND YOU DIDN'T HAVE MONEY TO GET MORE.: NEVER TRUE

## 2022-08-08 ASSESSMENT — PATIENT HEALTH QUESTIONNAIRE - PHQ9
SUM OF ALL RESPONSES TO PHQ QUESTIONS 1-9: 0
1. LITTLE INTEREST OR PLEASURE IN DOING THINGS: 0
SUM OF ALL RESPONSES TO PHQ QUESTIONS 1-9: 0
SUM OF ALL RESPONSES TO PHQ QUESTIONS 1-9: 0
SUM OF ALL RESPONSES TO PHQ9 QUESTIONS 1 & 2: 0
2. FEELING DOWN, DEPRESSED OR HOPELESS: 0
SUM OF ALL RESPONSES TO PHQ QUESTIONS 1-9: 0

## 2022-08-08 ASSESSMENT — ENCOUNTER SYMPTOMS
SHORTNESS OF BREATH: 0
COLOR CHANGE: 0
NAUSEA: 0
VOMITING: 0

## 2022-08-08 ASSESSMENT — SOCIAL DETERMINANTS OF HEALTH (SDOH): HOW HARD IS IT FOR YOU TO PAY FOR THE VERY BASICS LIKE FOOD, HOUSING, MEDICAL CARE, AND HEATING?: NOT HARD AT ALL

## 2022-08-08 NOTE — PROGRESS NOTES
8/8/2022 8:32 AM  Location:[facility] [address] [facility]  [place]  Patient #:  [id number]  YOB: 1960          YOUR LAST HEMOGLOBIN A1CS:   No results found for: HBA1C, [id number]    YOUR LAST LIPID PROFILE:   Lab Results   Component Value Date/Time    CHOL 208 09/09/2021 08:21 AM    HDL 50 09/09/2021 08:21 AM    VLDL 23 09/09/2021 08:21 AM         Lab Results   Component Value Date/Time    GFRAA 66 02/24/2022 08:53 AM    BUN 20 02/24/2022 08:53 AM     02/24/2022 08:53 AM    K 4.8 02/24/2022 08:53 AM     02/24/2022 08:53 AM    CO2 23 02/24/2022 08:53 AM           History of Present Illness     Chief Complaint   Patient presents with    Leg Pain     Right leg pain. Has a knot for 3 weeks where she had banged it a few weeks ago      Rash     On face since last blood work        Ms. Juan Mishra is a 57 y.o. female  who presents for right leg swelling/rash. Ms. Leroy King presents today with right anterior lower leg night after tripping over a footstool nearly 1 month ago. She states that she felt a knot develop and then her leg began to feel tight. She reports that she has no pain but the knot has not seemed to go away. She has concerns for DVT. She has no history of DVT or PE, denies posterior calf pain or entire leg swelling, denies recent travel or hospitalization, not on oral estrogens or active cancer. She denies associated chest pain or shortness of breath. She denies pain with walking. Notes that it does become sore with palpation. She has been taking a baby aspirin daily. She also reports an off and on rash to her face which seems to be associated with sunshine. REBECCA in February was negative. Pt denies any new medications, clothing, detergents or soaps, animal contact, new or unusual food intake such as shellfish, perfumes or cologne use prior to onset of rash.  Pt denies shortness of breath, wheezing, or dizziness             Allergies   Allergen Reactions Meperidine Nausea And Vomiting     Past Medical History:   Diagnosis Date    Diaphragmatic hernia without mention of obstruction or gangrene 6/2/2015    Diverticulosis     Effusion of ankle and foot joint     Encounter for long-term (current) use of other medications     Helminth infection, unspecified     Morbid obesity ([address] 6/2/2015    Nausea & vomiting     Nontoxic uninodular goiter 6/2/2015    Other specified erythematous condition(695.89)     Overweight(278.02)     PMB (postmenopausal bleeding)      Social History     Socioeconomic History    Marital status: Single     Spouse name: None    Number of children: None    Years of education: None    Highest education level: None   Tobacco Use    Smoking status: Never    Smokeless tobacco: Never   Substance and Sexual Activity    Alcohol use: Yes    Drug use: No     Social Determinants of Health     Financial Resource Strain: Low Risk     Difficulty of Paying Living Expenses: Not hard at all   Food Insecurity: No Food Insecurity    Worried About Running Out of Food in the Last Year: Never true    Ran Out of Food in the Last Year: Never true     Past Surgical History:   Procedure Laterality Date    CHOLECYSTECTOMY  1998    CT RETROPERITONEAL PERC DRAIN  11/13/2018    CT RETROPERITONEAL PERC DRAIN 11/13/2018 [facility]OLOGY CT SCAN    HYSTERECTOMY, TOTAL ABDOMINAL (CERVIX REMOVED) N/A 06/2019    IR TUBE CHANGE  11/20/2018    OTHER SURGICAL HISTORY      Abcess drained on colon after bowel perferation with colonoscopy    TONSILLECTOMY  1974     Current Outpatient Medications   Medication Sig Dispense Refill    silver sulfADIAZINE (SILVADENE) 1 % cream Apply thick layer twice daily 50 g 0     No current facility-administered medications for this visit.      Health Maintenance   Topic Date Due    HIV screen  Never done    DTaP/Tdap/Td vaccine (1 - Tdap) Never done    Shingles vaccine (1 of 2) Never done    Breast cancer screen  06/02/2020    COVID-19 Vaccine (3 - Booster for Pfizer series) 02/21/2022    Flu vaccine (1) 09/01/2022    Depression Screen  09/13/2022    Colorectal Cancer Screen  10/18/2023    Diabetes screen  02/24/2025    Lipids  09/09/2026    Hepatitis C screen  Completed    Hepatitis A vaccine  Aged Out    Hepatitis B vaccine  Aged Out    Hib vaccine  Aged Out    Meningococcal (ACWY) vaccine  Aged Out    Pneumococcal 0-64 years Vaccine  Aged Out     Family History   Problem Relation Age of Onset    Cancer Maternal Grandmother         colon cancer    Cancer Mother     Heart Disease Mother     Stroke Father     Diabetes Father     Cancer Brother              Review of Systems  Review of Systems   Constitutional:  Negative for chills and fever. Respiratory:  Negative for shortness of breath. Cardiovascular:  Positive for leg swelling (anterior right lower leg). Negative for chest pain. Gastrointestinal:  Negative for nausea and vomiting. Skin:  Positive for rash (facial rash). Negative for color change. BP (!) 143/72 (Site: Right Upper Arm, Position: Sitting, Cuff Size: Large Adult)   Pulse 80   Temp 98 °F (36.7 °C) (Temporal)   Resp 18   Ht 4' 11\" (1.499 m)   Wt 218 lb (98.9 kg)   SpO2 97% Comment: ra  BMI 44.03 kg/m²       Physical Exam    Physical Exam  Vitals and nursing note reviewed. Constitutional:       General: She is not in acute distress. Appearance: She is not ill-appearing, toxic-appearing or diaphoretic. HENT:      Head:        Comments: Slight erythema noted     Mouth/Throat:      Mouth: Mucous membranes are moist.   Cardiovascular:      Rate and Rhythm: Regular rhythm. Pulmonary:      Effort: No respiratory distress. Breath sounds: No wheezing, rhonchi or rales. Musculoskeletal:      Right lower leg: Tenderness present. No deformity or lacerations. No edema. Left lower leg: No edema. Legs:       Comments: Palpable firm nodule to right anterior lower leg. Negative rufus's sign, no palpable cords.  No ecchymosis, mild pain associated with palpation of this area. Skin:     Findings: Rash present. Rash is not crusting or urticarial.   Neurological:      Mental Status: She is oriented to person, place, and time. Assessment & Plan         Current Outpatient Medications   Medication Sig Dispense Refill    silver sulfADIAZINE (SILVADENE) 1 % cream Apply thick layer twice daily 50 g 0     No current facility-administered medications for this visit. 1. Rosacea  REBECCA negative in February, discussed protection from the sun, possible dermatology referral if persists. Trial below, has an appointment upcoming in September for her physical.  If this rash persists will refer to dermatology for further evaluation. Knows to call the office for any signs of secondary cellulitis, new or worsening symptoms. - metroNIDAZOLE (METROGEL) 0.75 % gel; Apply topically 2 times daily. Dispense: 1 each; Refill: 0    2. Localized swelling of right lower leg  Most likely a traumatic superficial thrombophlebitis. Discussed localized heat, she will continue baby aspirin for now. Will check x-ray and ultrasound. Knows to call the office for any increased pain, swelling, new or worsening symptoms. - Vascular duplex lower extremity venous right; Future  - XR TIBIA FIBULA RIGHT (2 VIEWS);  Future        Faustina Winkler NP, APRN - CNP

## 2022-08-16 ENCOUNTER — HOSPITAL ENCOUNTER (OUTPATIENT)
Dept: ULTRASOUND IMAGING | Age: 62
Discharge: HOME | End: 2022-08-19
Payer: COMMERCIAL

## 2022-08-16 ENCOUNTER — HOSPITAL ENCOUNTER (OUTPATIENT)
Dept: GENERAL RADIOLOGY | Age: 62
Discharge: HOME | End: 2022-08-19
Payer: COMMERCIAL

## 2022-08-16 DIAGNOSIS — R22.41 LOCALIZED SWELLING OF RIGHT LOWER LEG: ICD-10-CM

## 2022-08-16 PROCEDURE — 73590 X-RAY EXAM OF LOWER LEG: CPT

## 2022-08-16 PROCEDURE — 93971 EXTREMITY STUDY: CPT

## 2022-08-24 DIAGNOSIS — E78.00 PURE HYPERCHOLESTEROLEMIA: ICD-10-CM

## 2022-08-24 DIAGNOSIS — E78.00 PURE HYPERCHOLESTEROLEMIA, UNSPECIFIED: Primary | ICD-10-CM

## 2022-08-24 DIAGNOSIS — Z79.899 ENCOUNTER FOR LONG-TERM (CURRENT) USE OF MEDICATIONS: ICD-10-CM

## 2022-09-07 ENCOUNTER — NURSE ONLY (OUTPATIENT)
Dept: INTERNAL MEDICINE CLINIC | Facility: CLINIC | Age: 62
End: 2022-09-07

## 2022-09-07 DIAGNOSIS — E78.00 PURE HYPERCHOLESTEROLEMIA, UNSPECIFIED: ICD-10-CM

## 2022-09-07 DIAGNOSIS — Z79.899 ENCOUNTER FOR LONG-TERM (CURRENT) USE OF MEDICATIONS: ICD-10-CM

## 2022-09-07 DIAGNOSIS — E78.00 PURE HYPERCHOLESTEROLEMIA: ICD-10-CM

## 2022-09-07 LAB
ALBUMIN SERPL-MCNC: 4 G/DL (ref 3.2–4.6)
ALBUMIN/GLOB SERPL: 1.1 {RATIO} (ref 1.2–3.5)
ALP SERPL-CCNC: 86 U/L (ref 50–136)
ALT SERPL-CCNC: 22 U/L (ref 12–65)
ANION GAP SERPL CALC-SCNC: 6 MMOL/L (ref 4–13)
AST SERPL-CCNC: 15 U/L (ref 15–37)
BASOPHILS # BLD: 0.1 K/UL (ref 0–0.2)
BASOPHILS NFR BLD: 1 % (ref 0–2)
BILIRUB SERPL-MCNC: 0.5 MG/DL (ref 0.2–1.1)
BUN SERPL-MCNC: 24 MG/DL (ref 8–23)
CALCIUM SERPL-MCNC: 9.7 MG/DL (ref 8.3–10.4)
CHLORIDE SERPL-SCNC: 106 MMOL/L (ref 101–110)
CHOLEST SERPL-MCNC: 216 MG/DL
CO2 SERPL-SCNC: 28 MMOL/L (ref 21–32)
CREAT SERPL-MCNC: 1.1 MG/DL (ref 0.6–1)
DIFFERENTIAL METHOD BLD: NORMAL
EOSINOPHIL # BLD: 0.3 K/UL (ref 0–0.8)
EOSINOPHIL NFR BLD: 4 % (ref 0.5–7.8)
ERYTHROCYTE [DISTWIDTH] IN BLOOD BY AUTOMATED COUNT: 12.7 % (ref 11.9–14.6)
GLOBULIN SER CALC-MCNC: 3.5 G/DL (ref 2.3–3.5)
GLUCOSE SERPL-MCNC: 95 MG/DL (ref 65–100)
HCT VFR BLD AUTO: 42.8 % (ref 35.8–46.3)
HDLC SERPL-MCNC: 52 MG/DL (ref 40–60)
HDLC SERPL: 4.2 {RATIO}
HGB BLD-MCNC: 13.9 G/DL (ref 11.7–15.4)
IMM GRANULOCYTES # BLD AUTO: 0 K/UL (ref 0–0.5)
IMM GRANULOCYTES NFR BLD AUTO: 0 % (ref 0–5)
LDLC SERPL CALC-MCNC: 138.2 MG/DL
LYMPHOCYTES # BLD: 1.9 K/UL (ref 0.5–4.6)
LYMPHOCYTES NFR BLD: 30 % (ref 13–44)
MCH RBC QN AUTO: 28.3 PG (ref 26.1–32.9)
MCHC RBC AUTO-ENTMCNC: 32.5 G/DL (ref 31.4–35)
MCV RBC AUTO: 87 FL (ref 79.6–97.8)
MONOCYTES # BLD: 0.5 K/UL (ref 0.1–1.3)
MONOCYTES NFR BLD: 8 % (ref 4–12)
NEUTS SEG # BLD: 3.7 K/UL (ref 1.7–8.2)
NEUTS SEG NFR BLD: 57 % (ref 43–78)
NRBC # BLD: 0 K/UL (ref 0–0.2)
PLATELET # BLD AUTO: 280 K/UL (ref 150–450)
PMV BLD AUTO: 10.6 FL (ref 9.4–12.3)
POTASSIUM SERPL-SCNC: 4.3 MMOL/L (ref 3.5–5.1)
PROT SERPL-MCNC: 7.5 G/DL (ref 6.3–8.2)
RBC # BLD AUTO: 4.92 M/UL (ref 4.05–5.2)
SODIUM SERPL-SCNC: 140 MMOL/L (ref 136–145)
T4 FREE SERPL-MCNC: 0.9 NG/DL (ref 0.78–1.46)
TRIGL SERPL-MCNC: 129 MG/DL (ref 35–150)
TSH, 3RD GENERATION: 1.74 UIU/ML (ref 0.36–3.74)
VLDLC SERPL CALC-MCNC: 25.8 MG/DL (ref 6–23)
WBC # BLD AUTO: 6.5 K/UL (ref 4.3–11.1)

## 2022-09-13 ENCOUNTER — OFFICE VISIT (OUTPATIENT)
Dept: INTERNAL MEDICINE CLINIC | Facility: CLINIC | Age: 62
End: 2022-09-13
Payer: COMMERCIAL

## 2022-09-13 VITALS
SYSTOLIC BLOOD PRESSURE: 142 MMHG | RESPIRATION RATE: 18 BRPM | HEIGHT: 59 IN | WEIGHT: 218 LBS | DIASTOLIC BLOOD PRESSURE: 74 MMHG | BODY MASS INDEX: 43.95 KG/M2 | HEART RATE: 60 BPM

## 2022-09-13 DIAGNOSIS — K57.30 DIVERTICULOSIS OF LARGE INTESTINE WITHOUT HEMORRHAGE: ICD-10-CM

## 2022-09-13 DIAGNOSIS — E66.01 CLASS 3 SEVERE OBESITY DUE TO EXCESS CALORIES WITHOUT SERIOUS COMORBIDITY WITH BODY MASS INDEX (BMI) OF 40.0 TO 44.9 IN ADULT (HCC): ICD-10-CM

## 2022-09-13 DIAGNOSIS — E04.1 NONTOXIC UNINODULAR GOITER: ICD-10-CM

## 2022-09-13 DIAGNOSIS — E78.00 PURE HYPERCHOLESTEROLEMIA: Primary | ICD-10-CM

## 2022-09-13 DIAGNOSIS — R03.0 ELEVATED BP WITHOUT DIAGNOSIS OF HYPERTENSION: ICD-10-CM

## 2022-09-13 DIAGNOSIS — Z00.00 GENERAL MEDICAL EXAM: ICD-10-CM

## 2022-09-13 DIAGNOSIS — Z12.31 ENCOUNTER FOR SCREENING MAMMOGRAM FOR MALIGNANT NEOPLASM OF BREAST: ICD-10-CM

## 2022-09-13 DIAGNOSIS — M79.645 BILATERAL THUMB PAIN: ICD-10-CM

## 2022-09-13 DIAGNOSIS — M79.644 BILATERAL THUMB PAIN: ICD-10-CM

## 2022-09-13 DIAGNOSIS — Z80.0 FAMILY HISTORY OF COLON CANCER: ICD-10-CM

## 2022-09-13 PROCEDURE — 99212 OFFICE O/P EST SF 10 MIN: CPT | Performed by: INTERNAL MEDICINE

## 2022-09-13 PROCEDURE — 99396 PREV VISIT EST AGE 40-64: CPT | Performed by: INTERNAL MEDICINE

## 2022-09-13 RX ORDER — NYSTATIN 100000 U/G
OINTMENT TOPICAL
Qty: 30 G | Refills: 1 | Status: SHIPPED | OUTPATIENT
Start: 2022-09-13

## 2022-09-13 ASSESSMENT — ENCOUNTER SYMPTOMS
VOMITING: 0
CONSTIPATION: 0
DIARRHEA: 0
BLOOD IN STOOL: 0
WHEEZING: 0
SHORTNESS OF BREATH: 0
NAUSEA: 0
COUGH: 0

## 2022-09-13 NOTE — PROGRESS NOTES
9/13/2022 9:21 AM  Location:[facility] [address] [facility]  [place]  Patient #:  [id number]  YOB: 1960            History of Present Illness     Chief Complaint   Patient presents with    Annual Exam    Hand Pain     Complains with thumb pain. Ms. Damari Alcantara is a 60 y.o. female  who presents for the above. Notes that she has fallen off the wagon related to her weight. Has been under stress related to her best friend and her  being diagnosed with cancer. Is not exercising regularly. Got COVID first time in August.  Is just now feeling like herself this week. Noted extreme exhaustion. Has pain at the base of her thumbs. Notes that during COVID it was way worse. Notes that her thumb pain flares a couple of times a month. When pain is really bad takes motrin or tylenol for the pain. Hand Pain   There was no injury mechanism. The quality of the pain is described as aching. Pertinent negatives include no chest pain or numbness.         Allergies   Allergen Reactions    Meperidine Nausea And Vomiting     Past Medical History:   Diagnosis Date    Diaphragmatic hernia without mention of obstruction or gangrene 6/2/2015    Diverticulosis     Effusion of ankle and foot joint     Encounter for long-term (current) use of other medications     Helminth infection, unspecified     Morbid obesity ([address] 6/2/2015    Nausea & vomiting     Nontoxic uninodular goiter 6/2/2015    Other specified erythematous condition(695.89)     Overweight(278.02)     PMB (postmenopausal bleeding)      Social History     Socioeconomic History    Marital status: Single     Spouse name: None    Number of children: None    Years of education: None    Highest education level: None   Tobacco Use    Smoking status: Never    Smokeless tobacco: Never   Substance and Sexual Activity    Alcohol use: Yes    Drug use: No     Social Determinants of Health     Financial Resource Strain: Low Risk Difficulty of Paying Living Expenses: Not hard at all   Food Insecurity: No Food Insecurity    Worried About [address] in the Last Year: Never true    Ran Out of Food in the Last Year: Never true     Past Surgical History:   Procedure Laterality Date    CHOLECYSTECTOMY  1998    CT RETROPERITONEAL PERC DRAIN  11/13/2018    CT RETROPERITONEAL PERC DRAIN 11/13/2018 [facility]OLOGY CT SCAN    HYSTERECTOMY, TOTAL ABDOMINAL (CERVIX REMOVED) N/A 06/2019    IR TUBE CHANGE  11/20/2018    OTHER SURGICAL HISTORY      Abcess drained on colon after bowel perferation with colonoscopy    TONSILLECTOMY  1974     Current Outpatient Medications   Medication Sig Dispense Refill    nystatin (MYCOSTATIN) 922352 UNIT/GM ointment Apply topically 2 times daily. 30 g 1    metroNIDAZOLE (METROGEL) 0.75 % gel Apply topically 2 times daily. 1 each 0     No current facility-administered medications for this visit. Health Maintenance   Topic Date Due    HIV screen  Never done    DTaP/Tdap/Td vaccine (1 - Tdap) Never done    Shingles vaccine (1 of 2) Never done    Breast cancer screen  06/02/2020    COVID-19 Vaccine (3 - Booster for Pfizer series) 02/21/2022    Flu vaccine (1) 09/01/2022    Depression Screen  08/08/2023    Colorectal Cancer Screen  10/18/2023    Lipids  09/07/2027    Hepatitis C screen  Completed    Hepatitis A vaccine  Aged Out    Hepatitis B vaccine  Aged Out    Hib vaccine  Aged Out    Meningococcal (ACWY) vaccine  Aged Out    Pneumococcal 0-64 years Vaccine  Aged Out     Family History   Problem Relation Age of Onset    Cancer Maternal Grandmother         colon cancer    Cancer Mother     Heart Disease Mother     Stroke Father     Diabetes Father     Cancer Brother              Review of Systems  Review of Systems   Constitutional:  Negative for chills, fever and unexpected weight change. Respiratory:  Negative for cough, shortness of breath and wheezing.     Cardiovascular:  Positive for palpitations (occasionally speeds up a bit). Negative for chest pain and leg swelling. Gastrointestinal:  Negative for blood in stool, constipation, diarrhea, nausea and vomiting. Genitourinary:  Negative for difficulty urinating, dysuria and hematuria. Musculoskeletal:  Positive for arthralgias. Neurological:  Negative for weakness and numbness. Psychiatric/Behavioral:  Negative for sleep disturbance. The patient is not nervous/anxious. BP (!) 142/74 (Site: Left Upper Arm, Position: Sitting, Cuff Size: Large Adult)   Pulse 60   Resp 18   Ht 4' 11\" (1.499 m)   Wt 218 lb (98.9 kg)   LMP  (LMP Unknown)   BMI 44.03 kg/m²       Physical Exam    Physical Exam  Constitutional:       Appearance: Normal appearance. She is obese. She is not ill-appearing. HENT:      Head: Normocephalic. Right Ear: Tympanic membrane normal.      Left Ear: Tympanic membrane normal.   Eyes:      Conjunctiva/sclera: Conjunctivae normal.      Pupils: Pupils are equal, round, and reactive to light. Neck:      Vascular: No carotid bruit. Cardiovascular:      Rate and Rhythm: Normal rate and regular rhythm. Pulmonary:      Effort: Pulmonary effort is normal.      Breath sounds: Normal breath sounds. No wheezing. Chest:   Breasts:     Right: No inverted nipple, mass, axillary adenopathy or supraclavicular adenopathy. Left: No inverted nipple, mass, axillary adenopathy or supraclavicular adenopathy. Abdominal:      General: Abdomen is flat. Palpations: Abdomen is soft. Tenderness: There is no abdominal tenderness. There is no right CVA tenderness or left CVA tenderness. Musculoskeletal:      Right hand: Tenderness present. Left hand: Tenderness present. Hands:       Cervical back: No tenderness. Thoracic back: No tenderness. Lumbar back: No tenderness. Right lower leg: No edema. Left lower leg: No edema. Comments:  With pressure against abduction of thumb   Lymphadenopathy: Upper Body:      Right upper body: No supraclavicular or axillary adenopathy. Left upper body: No supraclavicular or axillary adenopathy. Neurological:      Mental Status: She is alert. Motor: No weakness. Gait: Gait normal.      Deep Tendon Reflexes:      Reflex Scores:       Patellar reflexes are 2+ on the right side and 2+ on the left side. Psychiatric:         Mood and Affect: Mood normal.         Behavior: Behavior normal.         Assessment & Plan    Current Outpatient Medications   Medication Sig Dispense Refill    nystatin (MYCOSTATIN) 194161 UNIT/GM ointment Apply topically 2 times daily. 30 g 1    metroNIDAZOLE (METROGEL) 0.75 % gel Apply topically 2 times daily. 1 each 0     No current facility-administered medications for this visit. Orders Placed This Encounter   Procedures    NAHUM DIGITAL SCREEN W OR WO CAD BILATERAL     Standing Status:   Future     Standing Expiration Date:   11/13/2023     Scheduling Instructions:      Corie Noguera       Orders Placed This Encounter   Medications    nystatin (MYCOSTATIN) 836332 UNIT/GM ointment     Sig: Apply topically 2 times daily. Dispense:  30 g     Refill:  1       There are no discontinued medications. Diagnosis Orders   1. Pure hypercholesterolemia        2. Nontoxic uninodular goiter        3. Family history of colon cancer        4. Class 3 severe obesity due to excess calories without serious comorbidity with body mass index (BMI) of 40.0 to 44.9 in adult ([facility] [address]        5. Diverticulosis of large intestine without hemorrhage        6. Encounter for screening mammogram for malignant neoplasm of breast  NAHUM DIGITAL SCREEN W OR WO CAD BILATERAL      7. Elevated BP without diagnosis of hypertension        8. Bilateral thumb pain        9. General medical exam           Will discuss imaging over the phone. Labs reviewed. Document BP twice weekly. Contact office if not <=130/80 consistently.    Recommended 30 minutes of aerobic exercise at least 4-5 days weekly. Follow up as documented or earlier as needed to follow up on BP and weight as well as elevated creatinine. No doubt related to mild dehydration. Encouraged the patient to drink lots of fluid. Knows to keep a low threshold for contacting the office with worsening symptoms related to her thumb pain. Call is worse or no better in the next 2 weeks. May need referral on to ortho for injections. Exercises provided today. Return in about 6 months (around 3/13/2023).           Bettie Richard MD

## 2023-04-04 RX ORDER — NYSTATIN 100000 U/G
OINTMENT TOPICAL
Qty: 30 G | Refills: 1 | Status: SHIPPED | OUTPATIENT
Start: 2023-04-04

## 2023-04-24 ENCOUNTER — PATIENT MESSAGE (OUTPATIENT)
Dept: INTERNAL MEDICINE CLINIC | Facility: CLINIC | Age: 63
End: 2023-04-24

## 2023-04-24 NOTE — TELEPHONE ENCOUNTER
From: Brigette Uriostegui  To: Dr. John Pike: 4/24/2023 10:13 AM EDT  Subject: Finger Nail Removal    Good morning About a month ago, my pinky finger appeared inflamed,red and swollen. I took oregano oil which calmed it down. The nail turned white and the whole nail became . . it fell off , a new nail grew but now that nail  almost completely. I cut almost half because it catches on everything. Now I can see that a portion of the original nail is still underneath making the top one grow incorrectly. I wonder if I need to have you remove what is left?  Its all exposed Thank you

## 2023-04-24 NOTE — TELEPHONE ENCOUNTER
Schedule 30 minute appointment. If she prefers not to wait until next available, let her know that I can refer her on to a hand specialist to do this. Sorry for the inconvenience. Thanks.   Lee Ann Troy

## 2023-04-26 ENCOUNTER — TELEPHONE (OUTPATIENT)
Dept: INTERNAL MEDICINE CLINIC | Facility: CLINIC | Age: 63
End: 2023-04-26

## 2023-04-26 ENCOUNTER — OFFICE VISIT (OUTPATIENT)
Dept: INTERNAL MEDICINE CLINIC | Facility: CLINIC | Age: 63
End: 2023-04-26
Payer: COMMERCIAL

## 2023-04-26 VITALS — SYSTOLIC BLOOD PRESSURE: 119 MMHG | DIASTOLIC BLOOD PRESSURE: 60 MMHG | HEART RATE: 67 BPM

## 2023-04-26 DIAGNOSIS — Z79.899 LONG-TERM USE OF HIGH-RISK MEDICATION: Primary | ICD-10-CM

## 2023-04-26 DIAGNOSIS — M79.644 PAIN OF FINGER OF RIGHT HAND: Primary | ICD-10-CM

## 2023-04-26 DIAGNOSIS — L60.8 NAIL DEFORMITY: ICD-10-CM

## 2023-04-26 PROCEDURE — 11730 AVULSION NAIL PLATE SIMPLE 1: CPT | Performed by: INTERNAL MEDICINE

## 2023-04-26 RX ORDER — HYDROCODONE BITARTRATE AND ACETAMINOPHEN 5; 325 MG/1; MG/1
1 TABLET ORAL EVERY 4 HOURS PRN
Qty: 18 TABLET | Refills: 0 | Status: SHIPPED | OUTPATIENT
Start: 2023-04-26 | End: 2023-04-29

## 2023-04-26 RX ORDER — TERBINAFINE HYDROCHLORIDE 250 MG/1
250 TABLET ORAL DAILY
Qty: 90 TABLET | Refills: 0 | Status: SHIPPED | OUTPATIENT
Start: 2023-04-26 | End: 2023-07-25

## 2023-04-26 SDOH — ECONOMIC STABILITY: FOOD INSECURITY: WITHIN THE PAST 12 MONTHS, YOU WORRIED THAT YOUR FOOD WOULD RUN OUT BEFORE YOU GOT MONEY TO BUY MORE.: NEVER TRUE

## 2023-04-26 SDOH — ECONOMIC STABILITY: TRANSPORTATION INSECURITY
IN THE PAST 12 MONTHS, HAS LACK OF TRANSPORTATION KEPT YOU FROM MEETINGS, WORK, OR FROM GETTING THINGS NEEDED FOR DAILY LIVING?: NO

## 2023-04-26 SDOH — ECONOMIC STABILITY: FOOD INSECURITY: WITHIN THE PAST 12 MONTHS, THE FOOD YOU BOUGHT JUST DIDN'T LAST AND YOU DIDN'T HAVE MONEY TO GET MORE.: NEVER TRUE

## 2023-04-26 SDOH — ECONOMIC STABILITY: INCOME INSECURITY: HOW HARD IS IT FOR YOU TO PAY FOR THE VERY BASICS LIKE FOOD, HOUSING, MEDICAL CARE, AND HEATING?: NOT VERY HARD

## 2023-04-26 SDOH — ECONOMIC STABILITY: HOUSING INSECURITY
IN THE LAST 12 MONTHS, WAS THERE A TIME WHEN YOU DID NOT HAVE A STEADY PLACE TO SLEEP OR SLEPT IN A SHELTER (INCLUDING NOW)?: NO

## 2023-04-26 NOTE — PROGRESS NOTES
4/26/2023 2:53 PM  Location:[facility] [address] [facility]  [place]  Patient #:  [id number]  YOB: 1960            History of Present Illness     Chief Complaint   Patient presents with    Nail Problem     Right Pinkie nail        Ms. Subhash Galvez is a 60 y.o. female  who presents for the above. Notes nail turned yellow and then started to come off. It is negative and everything is uncomfortable.          Allergies   Allergen Reactions    Meperidine Nausea And Vomiting     Past Medical History:   Diagnosis Date    Diaphragmatic hernia without mention of obstruction or gangrene 6/2/2015    Diverticulosis     Effusion of ankle and foot joint     Encounter for long-term (current) use of other medications     Helminth infection, unspecified     Morbid obesity ([address] 6/2/2015    Nausea & vomiting     Nontoxic uninodular goiter 6/2/2015    Other specified erythematous condition(695.89)     Overweight(278.02)     PMB (postmenopausal bleeding)      Social History     Socioeconomic History    Marital status: Single     Spouse name: None    Number of children: None    Years of education: None    Highest education level: None   Tobacco Use    Smoking status: Never     Passive exposure: Past    Smokeless tobacco: Never   Substance and Sexual Activity    Alcohol use: Yes    Drug use: No     Social Determinants of Health     Financial Resource Strain: Low Risk     Difficulty of Paying Living Expenses: Not hard at all   Food Insecurity: No Food Insecurity    Worried About Running Out of Food in the Last Year: Never true    Ran Out of Food in the Last Year: Never true     Past Surgical History:   Procedure Laterality Date    CHOLECYSTECTOMY  1998    CT RETROPERITONEAL PERC DRAIN  11/13/2018    CT RETROPERITONEAL PERC DRAIN 11/13/2018 [facility]OLOGY CT SCAN    HYSTERECTOMY, TOTAL ABDOMINAL (CERVIX REMOVED) N/A 06/2019    IR TUBE CHANGE  11/20/2018    OTHER SURGICAL HISTORY      Abcess drained on

## 2023-08-28 DIAGNOSIS — Z00.00 ROUTINE GENERAL MEDICAL EXAMINATION AT A HEALTH CARE FACILITY: Primary | ICD-10-CM

## 2023-09-14 ENCOUNTER — TELEPHONE (OUTPATIENT)
Dept: INTERNAL MEDICINE CLINIC | Facility: CLINIC | Age: 63
End: 2023-09-14

## 2023-09-14 NOTE — TELEPHONE ENCOUNTER
This note has been redacted. Please contact the HIM Department/Chart Correction Manager if there are any questions.

## 2023-10-30 ENCOUNTER — OFFICE VISIT (OUTPATIENT)
Dept: INTERNAL MEDICINE CLINIC | Facility: CLINIC | Age: 63
End: 2023-10-30
Payer: COMMERCIAL

## 2023-10-30 VITALS
BODY MASS INDEX: 45.08 KG/M2 | TEMPERATURE: 97.9 F | RESPIRATION RATE: 16 BRPM | HEIGHT: 59 IN | SYSTOLIC BLOOD PRESSURE: 137 MMHG | HEART RATE: 73 BPM | WEIGHT: 223.6 LBS | DIASTOLIC BLOOD PRESSURE: 72 MMHG

## 2023-10-30 DIAGNOSIS — R19.06 EPIGASTRIC FULLNESS: Primary | ICD-10-CM

## 2023-10-30 DIAGNOSIS — K43.9 HERNIA OF ABDOMINAL WALL: ICD-10-CM

## 2023-10-30 LAB
ALBUMIN SERPL-MCNC: 3.6 G/DL (ref 3.2–4.6)
ALBUMIN/GLOB SERPL: 1.1 (ref 0.4–1.6)
ALP SERPL-CCNC: 95 U/L (ref 50–136)
ALT SERPL-CCNC: 24 U/L (ref 12–65)
ANION GAP SERPL CALC-SCNC: 6 MMOL/L (ref 2–11)
AST SERPL-CCNC: 17 U/L (ref 15–37)
BASOPHILS # BLD: 0.1 K/UL (ref 0–0.2)
BASOPHILS NFR BLD: 1 % (ref 0–2)
BILIRUB SERPL-MCNC: 0.3 MG/DL (ref 0.2–1.1)
BUN SERPL-MCNC: 16 MG/DL (ref 8–23)
CALCIUM SERPL-MCNC: 9.1 MG/DL (ref 8.3–10.4)
CHLORIDE SERPL-SCNC: 107 MMOL/L (ref 101–110)
CO2 SERPL-SCNC: 29 MMOL/L (ref 21–32)
CREAT SERPL-MCNC: 1.1 MG/DL (ref 0.6–1)
DIFFERENTIAL METHOD BLD: NORMAL
EOSINOPHIL # BLD: 0.3 K/UL (ref 0–0.8)
EOSINOPHIL NFR BLD: 4 % (ref 0.5–7.8)
ERYTHROCYTE [DISTWIDTH] IN BLOOD BY AUTOMATED COUNT: 13.2 % (ref 11.9–14.6)
GLOBULIN SER CALC-MCNC: 3.3 G/DL (ref 2.8–4.5)
GLUCOSE SERPL-MCNC: 104 MG/DL (ref 65–100)
HCT VFR BLD AUTO: 43.4 % (ref 35.8–46.3)
HGB BLD-MCNC: 14 G/DL (ref 11.7–15.4)
IMM GRANULOCYTES # BLD AUTO: 0 K/UL (ref 0–0.5)
IMM GRANULOCYTES NFR BLD AUTO: 1 % (ref 0–5)
LIPASE SERPL-CCNC: 124 U/L (ref 73–393)
LYMPHOCYTES # BLD: 2.2 K/UL (ref 0.5–4.6)
LYMPHOCYTES NFR BLD: 28 % (ref 13–44)
MCH RBC QN AUTO: 27.9 PG (ref 26.1–32.9)
MCHC RBC AUTO-ENTMCNC: 32.3 G/DL (ref 31.4–35)
MCV RBC AUTO: 86.5 FL (ref 82–102)
MONOCYTES # BLD: 0.5 K/UL (ref 0.1–1.3)
MONOCYTES NFR BLD: 6 % (ref 4–12)
NEUTS SEG # BLD: 5 K/UL (ref 1.7–8.2)
NEUTS SEG NFR BLD: 60 % (ref 43–78)
NRBC # BLD: 0 K/UL (ref 0–0.2)
PLATELET # BLD AUTO: 297 K/UL (ref 150–450)
PMV BLD AUTO: 10.8 FL (ref 9.4–12.3)
POTASSIUM SERPL-SCNC: 3.9 MMOL/L (ref 3.5–5.1)
PROT SERPL-MCNC: 6.9 G/DL (ref 6.3–8.2)
RBC # BLD AUTO: 5.02 M/UL (ref 4.05–5.2)
SODIUM SERPL-SCNC: 142 MMOL/L (ref 133–143)
WBC # BLD AUTO: 8.1 K/UL (ref 4.3–11.1)

## 2023-10-30 PROCEDURE — 99214 OFFICE O/P EST MOD 30 MIN: CPT | Performed by: NURSE PRACTITIONER

## 2023-10-30 PROCEDURE — 93000 ELECTROCARDIOGRAM COMPLETE: CPT | Performed by: NURSE PRACTITIONER

## 2023-10-30 ASSESSMENT — PATIENT HEALTH QUESTIONNAIRE - PHQ9
SUM OF ALL RESPONSES TO PHQ QUESTIONS 1-9: 0
SUM OF ALL RESPONSES TO PHQ9 QUESTIONS 1 & 2: 0
1. LITTLE INTEREST OR PLEASURE IN DOING THINGS: 0
2. FEELING DOWN, DEPRESSED OR HOPELESS: 0
SUM OF ALL RESPONSES TO PHQ QUESTIONS 1-9: 0

## 2023-10-30 ASSESSMENT — ENCOUNTER SYMPTOMS
ABDOMINAL PAIN: 0
SHORTNESS OF BREATH: 1
NAUSEA: 0
DIARRHEA: 0
VOMITING: 0
CONSTIPATION: 0
ABDOMINAL DISTENTION: 1

## 2023-10-30 NOTE — PROGRESS NOTES
Pulmonary:      Effort: No respiratory distress. Breath sounds: No wheezing, rhonchi or rales. Abdominal:      General: Bowel sounds are normal. There is no distension. Palpations: Abdomen is soft. There is mass. There is no pulsatile mass. Tenderness: There is no abdominal tenderness. Hernia: A hernia is present. Hernia is present in the ventral area. Comments: Appears firm, non tender. Musculoskeletal:      Right lower leg: No edema. Left lower leg: No edema. Neurological:      Mental Status: She is oriented to person, place, and time. Assessment & Plan         Current Outpatient Medications   Medication Sig Dispense Refill    nystatin (MYCOSTATIN) 982692 UNIT/GM ointment Apply topically 2 times daily. 30 g 1     No current facility-administered medications for this visit. No orders of the defined types were placed in this encounter. Medications Discontinued During This Encounter   Medication Reason    metroNIDAZOLE (METROGEL) 0.75 % gel LIST CLEANUP       1. Epigastric fullness  Really seems to be related to possible cystic mass/hernia palpable. No discoloration, pain or pulsation. Check labs and CT. Knows to call for any new or concerning sx. Discussed presenting to the ER for any acute pain, vomiting,high fevers, chest pain, sob.     - CBC with Auto Differential; Future  - Comprehensive Metabolic Panel; Future  - Lipase; Future  - EKG 12 Lead  EKG reviewed by myself and Zion Birmingham  Normal Sr  Rate normal  QRS normal  No ST elevation    - Lipase  - Comprehensive Metabolic Panel  - CBC with Auto Differential    2. Body mass index (BMI) 45.0-49.9, adult (HCC)2    3. Hernia of abdominal wall  - CT ABDOMEN PELVIS W IV CONTRAST Additional Contrast? Radiologist Recommendation; Future      Total time for encounter on day of encounter was 31 minutes.   This time includes chart prep, review of tests/procedures, review of other provider's notes, documentation and

## 2023-10-30 NOTE — PATIENT INSTRUCTIONS
I have ordered testing today for your condition. If you do not hear from [facility] to schedule you in the next few days, please call [phone] to schedule your testing. I will be in touch with you after I receive test results.

## 2023-11-08 ENCOUNTER — TELEPHONE (OUTPATIENT)
Dept: INTERNAL MEDICINE CLINIC | Facility: CLINIC | Age: 63
End: 2023-11-08

## 2023-11-08 ENCOUNTER — HOSPITAL ENCOUNTER (OUTPATIENT)
Dept: CT IMAGING | Age: 63
Discharge: HOME | End: 2023-11-11
Payer: COMMERCIAL

## 2023-11-08 DIAGNOSIS — K43.9 VENTRAL HERNIA WITHOUT OBSTRUCTION OR GANGRENE: Primary | ICD-10-CM

## 2023-11-08 DIAGNOSIS — K43.9 HERNIA OF ABDOMINAL WALL: ICD-10-CM

## 2023-11-08 PROCEDURE — 74177 CT ABD & PELVIS W/CONTRAST: CPT

## 2023-11-08 PROCEDURE — 6360000004 HC RX CONTRAST MEDICATION: Performed by: NURSE PRACTITIONER

## 2023-11-08 RX ADMIN — IOPAMIDOL 100 ML: 755 INJECTION, SOLUTION INTRAVENOUS at 15:50

## 2023-11-08 RX ADMIN — DIATRIZOATE MEGLUMINE AND DIATRIZOATE SODIUM 15 ML: 660; 100 LIQUID ORAL; RECTAL at 15:50

## 2023-11-08 NOTE — TELEPHONE ENCOUNTER
Called and discussed CT findings with patient. She will be evaluated by surgery. Referral placed. Discussed indications for presenting to the ER for incarceration. Having no pain at this time. IMPRESSION:  1. Large upper abdominal midline ventral hernia with a 4.5 cm wall defect  containing loops of nonobstructed colon. 2. Smaller 3.5 cm umbilical hernia defect contains loops of nonobstructed small  bowel.

## 2023-11-14 ENCOUNTER — TELEPHONE (OUTPATIENT)
Dept: INTERNAL MEDICINE CLINIC | Facility: CLINIC | Age: 63
End: 2023-11-14

## 2023-11-14 NOTE — TELEPHONE ENCOUNTER
Amina Rodriguez-  This patient was referred to our general surgeon's due to a large ventral hernia. She apparently heard from the office yesterday and they told her that she would have to be referred elsewhere because it is too complicated. If so, she would like to be referred to [address], Dr. Angel Trujillo group. I looked in her chart and don't se where they have reviewed her CT or referred out, so I placed the  referral for her today. Can we expedite this for her? Thanks so much! Please let me know if I need to do anything else!   Ninoska Patel, [address] [facility]

## 2023-11-17 ENCOUNTER — TELEPHONE (OUTPATIENT)
Dept: INTERNAL MEDICINE CLINIC | Facility: CLINIC | Age: 63
End: 2023-11-17

## 2023-11-17 NOTE — TELEPHONE ENCOUNTER
Spoke to [place] and she made the patient the appt there. She said they are short staffed, no referral coordinator , they closed at 12 ( and just support staff only ), they could not look for referral right now. [facility] stated the patient was irritated that  Dr. Diana Riley is booking out until Jan and that the pt wanted before the end of the year.

## 2023-11-17 NOTE — TELEPHONE ENCOUNTER
Pt states Dr. Neves Apo office has not yet received the referral and their soonest appt is not until January. Pt would like to be seen sooner and would like to know how to proceed.

## 2023-11-22 ENCOUNTER — PATIENT MESSAGE (OUTPATIENT)
Dept: INTERNAL MEDICINE CLINIC | Facility: CLINIC | Age: 63
End: 2023-11-22

## 2024-03-05 RX ORDER — NYSTATIN 100000 [USP'U]/G
POWDER TOPICAL
Qty: 60 G | Refills: 1 | Status: SHIPPED | OUTPATIENT
Start: 2024-03-05

## 2024-10-14 ENCOUNTER — TELEPHONE (OUTPATIENT)
Dept: INTERNAL MEDICINE CLINIC | Facility: CLINIC | Age: 64
End: 2024-10-14

## 2024-10-14 NOTE — TELEPHONE ENCOUNTER
----- Message from Jimmie ESTRADA sent at 10/14/2024 10:51 AM EDT -----  Regarding: [facility] Appointment Request  [facility] Appointment Request    Patient needs appointment for [facility] Appointment Type: Annual Visit.    Patient Requested Dates(s): Early January  Patient Requested Time: Afternoon preferably or any available time   Provider Name: Danyelle Miller     Reason for Appointment Request: Established Patient - Available appointments did not meet patient need. PT called in to set an appointment for physical and she mentioned that she cannot wait up until April.   --------------------------------------------------------------------------------------------------------------------------    Relationship to Patient: Self     Call Back Information: OK to leave message on voicemail  Preferred Call Back Number: Phone [phone] (home)

## 2024-11-05 DIAGNOSIS — R21 RASH AND NONSPECIFIC SKIN ERUPTION: Primary | ICD-10-CM

## 2024-11-05 DIAGNOSIS — Z87.2 HISTORY OF PSORIASIS: ICD-10-CM

## 2024-11-05 RX ORDER — FLUOCINONIDE 1 MG/G
CREAM TOPICAL
Qty: 120 G | Refills: 0 | Status: SHIPPED | OUTPATIENT
Start: 2024-11-05 | End: 2024-11-07

## 2024-11-06 SDOH — ECONOMIC STABILITY: FOOD INSECURITY: WITHIN THE PAST 12 MONTHS, THE FOOD YOU BOUGHT JUST DIDN'T LAST AND YOU DIDN'T HAVE MONEY TO GET MORE.: NEVER TRUE

## 2024-11-06 SDOH — ECONOMIC STABILITY: FOOD INSECURITY: WITHIN THE PAST 12 MONTHS, YOU WORRIED THAT YOUR FOOD WOULD RUN OUT BEFORE YOU GOT MONEY TO BUY MORE.: NEVER TRUE

## 2024-11-06 SDOH — ECONOMIC STABILITY: INCOME INSECURITY: HOW HARD IS IT FOR YOU TO PAY FOR THE VERY BASICS LIKE FOOD, HOUSING, MEDICAL CARE, AND HEATING?: NOT VERY HARD

## 2024-11-06 ASSESSMENT — PATIENT HEALTH QUESTIONNAIRE - PHQ9
2. FEELING DOWN, DEPRESSED OR HOPELESS: NOT AT ALL
1. LITTLE INTEREST OR PLEASURE IN DOING THINGS: NOT AT ALL
SUM OF ALL RESPONSES TO PHQ QUESTIONS 1-9: 0
SUM OF ALL RESPONSES TO PHQ9 QUESTIONS 1 & 2: 0
SUM OF ALL RESPONSES TO PHQ9 QUESTIONS 1 & 2: 0
SUM OF ALL RESPONSES TO PHQ QUESTIONS 1-9: 0
2. FEELING DOWN, DEPRESSED OR HOPELESS: NOT AT ALL
SUM OF ALL RESPONSES TO PHQ QUESTIONS 1-9: 0
SUM OF ALL RESPONSES TO PHQ QUESTIONS 1-9: 0
1. LITTLE INTEREST OR PLEASURE IN DOING THINGS: NOT AT ALL

## 2024-11-07 ENCOUNTER — OFFICE VISIT (OUTPATIENT)
Dept: INTERNAL MEDICINE CLINIC | Facility: CLINIC | Age: 64
End: 2024-11-07

## 2024-11-07 VITALS
HEART RATE: 74 BPM | BODY MASS INDEX: 43.34 KG/M2 | HEIGHT: 59 IN | RESPIRATION RATE: 18 BRPM | OXYGEN SATURATION: 95 % | DIASTOLIC BLOOD PRESSURE: 77 MMHG | WEIGHT: 215 LBS | SYSTOLIC BLOOD PRESSURE: 135 MMHG | TEMPERATURE: 97.3 F

## 2024-11-07 DIAGNOSIS — I77.6 VASCULITIS (HCC): Primary | ICD-10-CM

## 2024-11-07 DIAGNOSIS — L03.119 CELLULITIS OF LOWER EXTREMITY, UNSPECIFIED LATERALITY: ICD-10-CM

## 2024-11-07 DIAGNOSIS — I77.6 VASCULITIS (HCC): ICD-10-CM

## 2024-11-07 LAB
ALBUMIN SERPL-MCNC: 4.2 G/DL (ref 3.2–4.6)
ALBUMIN/GLOB SERPL: 1.2 (ref 1–1.9)
ALP SERPL-CCNC: 87 U/L (ref 35–104)
ALT SERPL-CCNC: 23 U/L (ref 8–45)
ANION GAP SERPL CALC-SCNC: 14 MMOL/L (ref 7–16)
AST SERPL-CCNC: 27 U/L (ref 15–37)
BASOPHILS # BLD: 0.1 K/UL (ref 0–0.2)
BASOPHILS NFR BLD: 1 % (ref 0–2)
BILIRUB SERPL-MCNC: 0.4 MG/DL (ref 0–1.2)
BUN SERPL-MCNC: 17 MG/DL (ref 8–23)
CALCIUM SERPL-MCNC: 10.2 MG/DL (ref 8.8–10.2)
CHLORIDE SERPL-SCNC: 100 MMOL/L (ref 98–107)
CO2 SERPL-SCNC: 26 MMOL/L (ref 20–29)
CREAT SERPL-MCNC: 1.04 MG/DL (ref 0.6–1.1)
CRP SERPL-MCNC: 1.1 MG/DL (ref 0–0.4)
DIFFERENTIAL METHOD BLD: NORMAL
EOSINOPHIL # BLD: 0.2 K/UL (ref 0–0.8)
EOSINOPHIL NFR BLD: 3 % (ref 0.5–7.8)
ERYTHROCYTE [DISTWIDTH] IN BLOOD BY AUTOMATED COUNT: 12.9 % (ref 11.9–14.6)
ERYTHROCYTE [SEDIMENTATION RATE] IN BLOOD: 7 MM/HR (ref 0–30)
EST. AVERAGE GLUCOSE BLD GHB EST-MCNC: 108 MG/DL
GLOBULIN SER CALC-MCNC: 3.4 G/DL (ref 2.3–3.5)
GLUCOSE SERPL-MCNC: 79 MG/DL (ref 70–99)
HBA1C MFR BLD: 5.4 % (ref 0–5.6)
HCT VFR BLD AUTO: 42.9 % (ref 35.8–46.3)
HGB BLD-MCNC: 14 G/DL (ref 11.7–15.4)
IMM GRANULOCYTES # BLD AUTO: 0 K/UL (ref 0–0.5)
IMM GRANULOCYTES NFR BLD AUTO: 0 % (ref 0–5)
LYMPHOCYTES # BLD: 2.5 K/UL (ref 0.5–4.6)
LYMPHOCYTES NFR BLD: 30 % (ref 13–44)
MCH RBC QN AUTO: 28.1 PG (ref 26.1–32.9)
MCHC RBC AUTO-ENTMCNC: 32.6 G/DL (ref 31.4–35)
MCV RBC AUTO: 86.1 FL (ref 82–102)
MONOCYTES # BLD: 0.6 K/UL (ref 0.1–1.3)
MONOCYTES NFR BLD: 7 % (ref 4–12)
NEUTS SEG # BLD: 4.8 K/UL (ref 1.7–8.2)
NEUTS SEG NFR BLD: 59 % (ref 43–78)
NRBC # BLD: 0 K/UL (ref 0–0.2)
PLATELET # BLD AUTO: 297 K/UL (ref 150–450)
PMV BLD AUTO: 11.2 FL (ref 9.4–12.3)
POTASSIUM SERPL-SCNC: 4.5 MMOL/L (ref 3.5–5.1)
PROT SERPL-MCNC: 7.6 G/DL (ref 6.3–8.2)
RBC # BLD AUTO: 4.98 M/UL (ref 4.05–5.2)
SODIUM SERPL-SCNC: 139 MMOL/L (ref 136–145)
WBC # BLD AUTO: 8.2 K/UL (ref 4.3–11.1)

## 2024-11-07 RX ORDER — TRIAMCINOLONE ACETONIDE 1 MG/G
CREAM TOPICAL
Qty: 90 G | Refills: 0 | Status: SHIPPED | OUTPATIENT
Start: 2024-11-07

## 2024-11-07 RX ORDER — CEPHALEXIN 500 MG/1
500 CAPSULE ORAL 3 TIMES DAILY
Qty: 15 CAPSULE | Refills: 0 | Status: SHIPPED | OUTPATIENT
Start: 2024-11-07 | End: 2024-11-12

## 2024-11-07 ASSESSMENT — ENCOUNTER SYMPTOMS
NAUSEA: 0
VOMITING: 0
COLOR CHANGE: 1

## 2024-11-07 NOTE — PROGRESS NOTES
11/7/2024 1:51 PM  Location:[facility] PHYSICIAN SERVICES  [facility] [place]ICINE  [place]  Patient #:  [id number]  YOB: 1960          YOUR LAST HEMOGLOBIN A1CS:   No results found for: \"HBA1C\", \"OSG3PLOM\"    YOUR LAST LIPID PROFILE:   Lab Results   Component Value Date/Time    CHOL 216 09/07/2022 08:23 AM    HDL 52 09/07/2022 08:23 AM    .2 09/07/2022 08:23 AM    VLDL 25.8 09/07/2022 08:23 AM    VLDL 23 09/09/2021 08:21 AM         Lab Results   Component Value Date/Time    GFRAA >60 09/07/2022 08:23 AM    BUN 16 10/30/2023 10:49 AM     10/30/2023 10:49 AM    K 3.9 10/30/2023 10:49 AM     10/30/2023 10:49 AM    CO2 29 10/30/2023 10:49 AM           History of Present Illness     Chief Complaint   Patient presents with    Skin Problem     Patient presents in the office today c/o bilateral bruising on lower extremities for months, but has worsened recently.  Patient is concerned about possible psoriasis.     Mass     Patient also c/o painful knot on the top of L foot        Ms. Ramirez is a 64 y.o. female  who presents for the above mentioned complaints.  Ms. Ramirez presents for worsening lower leg rash. Reports that she has always had dry patches to her legs which she attributed to psoriasis.   Since August, she has been standing more and has had increased dry, red and scaly patches to her lower legs. She also reports dry patches o her elbows.     She reports recent increased stress. She has also lost weight intentionally.  Since changing her diet and using a thick emollient, her sx have improved.     She reports no f/c/n/v.     She does have an intermittent cyst under her arm that flares up at times.  No history of MRSA.           Allergies   Allergen Reactions    Sulfa Antibiotics      Other Reaction(s): Unknown-Unspecified    Meperidine Nausea And Vomiting     Past Medical History:   Diagnosis Date    Diaphragmatic hernia without mention of obstruction or gangrene 6/2/2015

## 2024-11-08 LAB — RHEUMATOID FACT SER QL LA: NEGATIVE

## 2024-11-09 DIAGNOSIS — R76.8 POSITIVE ANA (ANTINUCLEAR ANTIBODY): Primary | ICD-10-CM

## 2024-11-09 DIAGNOSIS — R21 RASH AND NONSPECIFIC SKIN ERUPTION: ICD-10-CM

## 2024-11-09 DIAGNOSIS — I77.6 VASCULITIS (HCC): ICD-10-CM

## 2024-11-09 LAB
ANA SER QL: POSITIVE
CENTROMERE B AB SER-ACNC: <0.2 AI (ref 0–0.9)
CHROMATIN AB SERPL-ACNC: <0.2 AI (ref 0–0.9)
DSDNA AB SER-ACNC: 1 IU/ML (ref 0–9)
ENA JO1 AB SER-ACNC: <0.2 AI (ref 0–0.9)
ENA RNP AB SER-ACNC: 1.6 AI (ref 0–0.9)
ENA SCL70 AB SER-ACNC: <0.2 AI (ref 0–0.9)
ENA SM AB SER-ACNC: <0.2 AI (ref 0–0.9)
ENA SS-A AB SER-ACNC: <0.2 AI (ref 0–0.9)
ENA SS-B AB SER-ACNC: <0.2 AI (ref 0–0.9)
Lab: ABNORMAL

## 2024-11-20 ENCOUNTER — TELEPHONE (OUTPATIENT)
Dept: INTERNAL MEDICINE CLINIC | Facility: CLINIC | Age: 64
End: 2024-11-20

## 2024-11-20 ENCOUNTER — TELEMEDICINE (OUTPATIENT)
Dept: INTERNAL MEDICINE CLINIC | Facility: CLINIC | Age: 64
End: 2024-11-20
Payer: COMMERCIAL

## 2024-11-20 DIAGNOSIS — N61.1 BREAST ABSCESS: Primary | ICD-10-CM

## 2024-11-20 PROCEDURE — 99213 OFFICE O/P EST LOW 20 MIN: CPT | Performed by: NURSE PRACTITIONER

## 2024-11-20 PROCEDURE — 1123F ACP DISCUSS/DSCN MKR DOCD: CPT | Performed by: NURSE PRACTITIONER

## 2024-11-20 RX ORDER — DOXYCYCLINE HYCLATE 100 MG
100 TABLET ORAL 2 TIMES DAILY
Qty: 14 TABLET | Refills: 0 | Status: SHIPPED | OUTPATIENT
Start: 2024-11-20 | End: 2024-11-27

## 2024-11-20 ASSESSMENT — ENCOUNTER SYMPTOMS
VOMITING: 0
NAUSEA: 0
COLOR CHANGE: 1

## 2024-11-20 NOTE — TELEPHONE ENCOUNTER
I have talked with Ms. Ramirez. She can't get off work until 3 pm. She stated that she can come in after she gets off today.   Your schedule is full. Please advise.

## 2024-11-20 NOTE — TELEPHONE ENCOUNTER
Please see if patient has time for a VV(or OV)today as I have appointments available. She has an area on her breast that needs evaluation. Thanks!  Kayley

## 2024-11-20 NOTE — PROGRESS NOTES
EOM    [x]  Normal    [] Abnormal -   Sclera  [x]  Normal    [] Abnormal -          Discharge [x]  None visible   [] Abnormal -     HENT: [x] Normocephalic, atraumatic  [] Abnormal -   [x] Mouth/Throat: Mucous membranes are moist    External Ears [x] Normal  [] Abnormal -    Neck: [x] No visualized mass [] Abnormal -     Pulmonary/Chest: [x] Respiratory effort normal   [x] No visualized signs of difficulty breathing or respiratory distress        [] Abnormal -      Musculoskeletal:   [] Normal gait with no signs of ataxia         [x] Normal range of motion of neck        [] Abnormal -     Neurological:        [x] No Facial Asymmetry (Cranial nerve 7 motor function) (limited exam due to video visit)          [x] No gaze palsy        [] Abnormal -          Skin:        [x] No significant exanthematous lesions or discoloration noted on facial skin         [] Abnormal -            Psychiatric:       [x] Normal Affect [] Abnormal -        [x] No Hallucinations    Other pertinent observable physical exam findings:-         On this date 11/20/2024 I have spent 20 minutes reviewing previous notes, test results and face to face (virtual) with the patient discussing the diagnosis and importance of compliance with the treatment plan as well as documenting on the day of the visit.    --Felicia Shabazz, BETO - CNP

## 2024-11-26 DIAGNOSIS — N61.1 BREAST ABSCESS: ICD-10-CM

## 2024-11-26 RX ORDER — DOXYCYCLINE HYCLATE 100 MG
100 TABLET ORAL 2 TIMES DAILY
Qty: 6 TABLET | Refills: 0 | Status: SHIPPED | OUTPATIENT
Start: 2024-11-26 | End: 2024-11-29

## 2024-12-10 ENCOUNTER — OFFICE VISIT (OUTPATIENT)
Dept: SURGERY | Age: 64
End: 2024-12-10
Payer: COMMERCIAL

## 2024-12-10 ENCOUNTER — PREP FOR PROCEDURE (OUTPATIENT)
Dept: SURGERY | Age: 64
End: 2024-12-10

## 2024-12-10 VITALS
DIASTOLIC BLOOD PRESSURE: 78 MMHG | WEIGHT: 215 LBS | BODY MASS INDEX: 43.34 KG/M2 | HEIGHT: 59 IN | HEART RATE: 68 BPM | OXYGEN SATURATION: 98 % | SYSTOLIC BLOOD PRESSURE: 118 MMHG

## 2024-12-10 DIAGNOSIS — N60.02 BENIGN CYST OF LEFT BREAST: Primary | ICD-10-CM

## 2024-12-10 PROCEDURE — 1123F ACP DISCUSS/DSCN MKR DOCD: CPT | Performed by: SURGERY

## 2024-12-10 PROCEDURE — 99203 OFFICE O/P NEW LOW 30 MIN: CPT | Performed by: SURGERY

## 2024-12-10 RX ORDER — DOXYCYCLINE HYCLATE 100 MG
100 TABLET ORAL 2 TIMES DAILY
COMMUNITY

## 2024-12-10 RX ORDER — SODIUM CHLORIDE 0.9 % (FLUSH) 0.9 %
5-40 SYRINGE (ML) INJECTION PRN
Status: CANCELLED | OUTPATIENT
Start: 2024-12-10

## 2024-12-10 RX ORDER — SODIUM CHLORIDE 9 MG/ML
INJECTION, SOLUTION INTRAVENOUS PRN
Status: CANCELLED | OUTPATIENT
Start: 2024-12-10

## 2024-12-10 RX ORDER — SODIUM CHLORIDE 0.9 % (FLUSH) 0.9 %
5-40 SYRINGE (ML) INJECTION EVERY 12 HOURS SCHEDULED
Status: CANCELLED | OUTPATIENT
Start: 2024-12-10

## 2024-12-10 NOTE — PROGRESS NOTES
[facility]  [address], [address]  [place] [address]  [phone]     12/10/2024  Patient:  Serenity Ramirez  : 1960    HPI  Serenity Ramirez is a 64 y.o. female who is seen for left breast cyst/abscess. She developed a \"bump\" at left breast two months ago, which turned red and inflamed. She started a course of antibiotics and the redness and swelling has since improved.     Other medical issues are reviewed as below.       Past Medical History:   Diagnosis Date    Diaphragmatic hernia without mention of obstruction or gangrene 2015    Diverticulosis     Effusion of ankle and foot joint     Encounter for long-term (current) use of other medications     Helminth infection, unspecified     Morbid obesity 2015    Nausea & vomiting     Nontoxic uninodular goiter 2015    Other specified erythematous condition(695.89)     Overweight(278.02)     PMB (postmenopausal bleeding)      Current Outpatient Medications   Medication Sig Dispense Refill    doxycycline hyclate (VIBRA-TABS) 100 MG tablet Take 1 tablet by mouth 2 times daily      triamcinolone (KENALOG) 0.1 % cream Apply topically 2 times daily. 90 g 0     No current facility-administered medications for this visit.     Allergies   Allergen Reactions    Sulfa Antibiotics      Other Reaction(s): Unknown-Unspecified    Meperidine Nausea And Vomiting     Past Surgical History:   Procedure Laterality Date    CHOLECYSTECTOMY      CT RETROPERITONEAL PERC DRAIN  2018    CT RETROPERITONEAL PERC DRAIN 2018 [facility] CT SCAN    HYSTERECTOMY, TOTAL ABDOMINAL (CERVIX REMOVED) N/A 2019    IR TUBE CHANGE  2018    OTHER SURGICAL HISTORY      Abcess drained on colon after bowel perferation with colonoscopy    TONSILLECTOMY  1974     Family History   Problem Relation Age of Onset    Cancer Maternal Grandmother         colon cancer    Cancer Mother     Heart Disease Mother     Stroke Father

## 2024-12-23 RX ORDER — PSEUDOEPHEDRINE HCL 30 MG/1
30 TABLET, FILM COATED ORAL EVERY 4 HOURS PRN
COMMUNITY

## 2024-12-23 NOTE — PROGRESS NOTES
Patient verified name and     Order for consent was found in EHR and does match case posting; patient verified.     Type 1b surgery, phone assessment complete.    Labs per surgeon: none  Labs per anesthesia protocol: none  EKG: none        Patient provided with and instructed on educational handouts including Guide to Surgery, Preventing Surgical Site Infections, Pain Management, and [place] [facility] Brochure.    Patient answered medical/surgical history questions at their best of ability. All prior to admission medications documented in EPIC. Original medication prescription bottle was not visualized during patient appointment.     Patient instructed to hold all vitamins 7 days prior to surgery and NSAIDS 5 days prior to surgery, patient verbalized understanding.     Patient teach back successful and patient demonstrates knowledge of instructions.

## 2024-12-23 NOTE — PROGRESS NOTES
PLEASE CONTINUE TAKING ALL PRESCRIPTION MEDICATIONS UP TO THE DAY OF SURGERY UNLESS OTHERWISE DIRECTED BELOW. You may take Tylenol, allergy,  and/or indigestion medications.     TAKE ONLY THESE MEDICATIONS ON THE DAY OF SURGERY    none           DISCONTINUE all vitamins and supplements 7 days prior to surgery. DISCONTINUE Non-Steroidal Anti-Inflammatory (NSAIDS) such as Advil and Aleve 5 days prior to surgery.     Home Medications to Hold- please continue all other medications except these.    none        Comments      On the day before surgery please take 2 Tylenol in the morning and then again before bed. You may use either regular or extra strength.           Please do not bring home medications with you on the day of surgery unless otherwise directed by your nurse.  If you are instructed to bring home medications, please give them to your nurse as they will be administered by the nursing staff.    If you have any questions, please call [facility] [phone].    A copy of this note was provided to the patient for reference.

## 2025-01-08 ENCOUNTER — ANESTHESIA EVENT (OUTPATIENT)
Dept: SURGERY | Age: 65
End: 2025-01-08
Payer: COMMERCIAL

## 2025-01-09 ENCOUNTER — ANESTHESIA (OUTPATIENT)
Dept: SURGERY | Age: 65
End: 2025-01-09
Payer: COMMERCIAL

## 2025-01-09 ENCOUNTER — HOSPITAL ENCOUNTER (OUTPATIENT)
Age: 65
Setting detail: OUTPATIENT SURGERY
Discharge: HOME | End: 2025-01-09
Attending: SURGERY | Admitting: SURGERY
Payer: COMMERCIAL

## 2025-01-09 VITALS
WEIGHT: 213 LBS | HEIGHT: 60 IN | DIASTOLIC BLOOD PRESSURE: 57 MMHG | OXYGEN SATURATION: 99 % | RESPIRATION RATE: 14 BRPM | TEMPERATURE: 98.6 F | BODY MASS INDEX: 41.82 KG/M2 | SYSTOLIC BLOOD PRESSURE: 104 MMHG | HEART RATE: 89 BPM

## 2025-01-09 DIAGNOSIS — N60.02 BREAST CYST, LEFT: Primary | ICD-10-CM

## 2025-01-09 PROCEDURE — 3700000000 HC ANESTHESIA ATTENDED CARE: Performed by: SURGERY

## 2025-01-09 PROCEDURE — 6360000002 HC RX W HCPCS: Performed by: SURGERY

## 2025-01-09 PROCEDURE — 2500000003 HC RX 250 WO HCPCS: Performed by: SURGERY

## 2025-01-09 PROCEDURE — 6360000002 HC RX W HCPCS: Performed by: REGISTERED NURSE

## 2025-01-09 PROCEDURE — 7100000011 HC PHASE II RECOVERY - ADDTL 15 MIN: Performed by: SURGERY

## 2025-01-09 PROCEDURE — 3600000012 HC SURGERY LEVEL 2 ADDTL 15MIN: Performed by: SURGERY

## 2025-01-09 PROCEDURE — 6370000000 HC RX 637 (ALT 250 FOR IP): Performed by: ANESTHESIOLOGY

## 2025-01-09 PROCEDURE — 7100000010 HC PHASE II RECOVERY - FIRST 15 MIN: Performed by: SURGERY

## 2025-01-09 PROCEDURE — 2500000003 HC RX 250 WO HCPCS: Performed by: REGISTERED NURSE

## 2025-01-09 PROCEDURE — 3600000002 HC SURGERY LEVEL 2 BASE: Performed by: SURGERY

## 2025-01-09 PROCEDURE — 88307 TISSUE EXAM BY PATHOLOGIST: CPT

## 2025-01-09 PROCEDURE — 2709999900 HC NON-CHARGEABLE SUPPLY: Performed by: SURGERY

## 2025-01-09 PROCEDURE — 19120 REMOVAL OF BREAST LESION: CPT | Performed by: SURGERY

## 2025-01-09 PROCEDURE — 88304 TISSUE EXAM BY PATHOLOGIST: CPT

## 2025-01-09 PROCEDURE — 3700000001 HC ADD 15 MINUTES (ANESTHESIA): Performed by: SURGERY

## 2025-01-09 RX ORDER — SODIUM CHLORIDE 9 MG/ML
INJECTION, SOLUTION INTRAVENOUS PRN
Status: DISCONTINUED | OUTPATIENT
Start: 2025-01-09 | End: 2025-01-09 | Stop reason: HOSPADM

## 2025-01-09 RX ORDER — SODIUM CHLORIDE 0.9 % (FLUSH) 0.9 %
5-40 SYRINGE (ML) INJECTION EVERY 12 HOURS SCHEDULED
Status: DISCONTINUED | OUTPATIENT
Start: 2025-01-09 | End: 2025-01-09 | Stop reason: HOSPADM

## 2025-01-09 RX ORDER — GLYCOPYRROLATE 0.2 MG/ML
INJECTION INTRAMUSCULAR; INTRAVENOUS
Status: DISCONTINUED | OUTPATIENT
Start: 2025-01-09 | End: 2025-01-09 | Stop reason: SDUPTHER

## 2025-01-09 RX ORDER — SODIUM CHLORIDE 0.9 % (FLUSH) 0.9 %
5-40 SYRINGE (ML) INJECTION PRN
Status: DISCONTINUED | OUTPATIENT
Start: 2025-01-09 | End: 2025-01-09 | Stop reason: HOSPADM

## 2025-01-09 RX ORDER — LIDOCAINE HYDROCHLORIDE 10 MG/ML
INJECTION, SOLUTION INFILTRATION; PERINEURAL PRN
Status: DISCONTINUED | OUTPATIENT
Start: 2025-01-09 | End: 2025-01-09 | Stop reason: HOSPADM

## 2025-01-09 RX ORDER — ONDANSETRON 2 MG/ML
4 INJECTION INTRAMUSCULAR; INTRAVENOUS
Status: DISCONTINUED | OUTPATIENT
Start: 2025-01-09 | End: 2025-01-09 | Stop reason: HOSPADM

## 2025-01-09 RX ORDER — LIDOCAINE HYDROCHLORIDE 10 MG/ML
1 INJECTION, SOLUTION INFILTRATION; PERINEURAL
Status: DISCONTINUED | OUTPATIENT
Start: 2025-01-09 | End: 2025-01-09 | Stop reason: HOSPADM

## 2025-01-09 RX ORDER — ACETAMINOPHEN 500 MG
1000 TABLET ORAL ONCE
Status: COMPLETED | OUTPATIENT
Start: 2025-01-09 | End: 2025-01-09

## 2025-01-09 RX ORDER — EPHEDRINE SULFATE 5 MG/ML
INJECTION INTRAVENOUS
Status: DISCONTINUED | OUTPATIENT
Start: 2025-01-09 | End: 2025-01-09 | Stop reason: SDUPTHER

## 2025-01-09 RX ORDER — OXYCODONE HYDROCHLORIDE 5 MG/1
10 TABLET ORAL PRN
Status: DISCONTINUED | OUTPATIENT
Start: 2025-01-09 | End: 2025-01-09 | Stop reason: HOSPADM

## 2025-01-09 RX ORDER — HYDROCODONE BITARTRATE AND ACETAMINOPHEN 5; 325 MG/1; MG/1
1 TABLET ORAL EVERY 6 HOURS PRN
Qty: 5 TABLET | Refills: 0 | Status: SHIPPED | OUTPATIENT
Start: 2025-01-09 | End: 2025-01-12

## 2025-01-09 RX ORDER — BUPIVACAINE HYDROCHLORIDE AND EPINEPHRINE 2.5; 5 MG/ML; UG/ML
INJECTION, SOLUTION EPIDURAL; INFILTRATION; INTRACAUDAL; PERINEURAL PRN
Status: DISCONTINUED | OUTPATIENT
Start: 2025-01-09 | End: 2025-01-09 | Stop reason: HOSPADM

## 2025-01-09 RX ORDER — MIDAZOLAM HYDROCHLORIDE 2 MG/2ML
2 INJECTION, SOLUTION INTRAMUSCULAR; INTRAVENOUS
Status: DISCONTINUED | OUTPATIENT
Start: 2025-01-09 | End: 2025-01-09 | Stop reason: HOSPADM

## 2025-01-09 RX ORDER — PROCHLORPERAZINE EDISYLATE 5 MG/ML
5 INJECTION INTRAMUSCULAR; INTRAVENOUS
Status: DISCONTINUED | OUTPATIENT
Start: 2025-01-09 | End: 2025-01-09 | Stop reason: HOSPADM

## 2025-01-09 RX ORDER — NALOXONE HYDROCHLORIDE 0.4 MG/ML
INJECTION, SOLUTION INTRAMUSCULAR; INTRAVENOUS; SUBCUTANEOUS PRN
Status: DISCONTINUED | OUTPATIENT
Start: 2025-01-09 | End: 2025-01-09 | Stop reason: HOSPADM

## 2025-01-09 RX ORDER — PROPOFOL 10 MG/ML
INJECTION, EMULSION INTRAVENOUS
Status: DISCONTINUED | OUTPATIENT
Start: 2025-01-09 | End: 2025-01-09 | Stop reason: SDUPTHER

## 2025-01-09 RX ORDER — SODIUM CHLORIDE, SODIUM LACTATE, POTASSIUM CHLORIDE, CALCIUM CHLORIDE 600; 310; 30; 20 MG/100ML; MG/100ML; MG/100ML; MG/100ML
INJECTION, SOLUTION INTRAVENOUS CONTINUOUS
Status: DISCONTINUED | OUTPATIENT
Start: 2025-01-09 | End: 2025-01-09 | Stop reason: HOSPADM

## 2025-01-09 RX ORDER — OXYCODONE HYDROCHLORIDE 5 MG/1
5 TABLET ORAL PRN
Status: DISCONTINUED | OUTPATIENT
Start: 2025-01-09 | End: 2025-01-09 | Stop reason: HOSPADM

## 2025-01-09 RX ORDER — DIPHENHYDRAMINE HYDROCHLORIDE 50 MG/ML
12.5 INJECTION INTRAMUSCULAR; INTRAVENOUS
Status: DISCONTINUED | OUTPATIENT
Start: 2025-01-09 | End: 2025-01-09 | Stop reason: HOSPADM

## 2025-01-09 RX ORDER — LIDOCAINE HYDROCHLORIDE 20 MG/ML
INJECTION, SOLUTION EPIDURAL; INFILTRATION; INTRACAUDAL; PERINEURAL
Status: DISCONTINUED | OUTPATIENT
Start: 2025-01-09 | End: 2025-01-09 | Stop reason: SDUPTHER

## 2025-01-09 RX ADMIN — GLYCOPYRROLATE 0.1 MG: 0.2 INJECTION INTRAMUSCULAR; INTRAVENOUS at 12:45

## 2025-01-09 RX ADMIN — PROPOFOL 140 MCG/KG/MIN: 10 INJECTION, EMULSION INTRAVENOUS at 12:03

## 2025-01-09 RX ADMIN — PHENYLEPHRINE HYDROCHLORIDE 100 MCG: 0.1 INJECTION, SOLUTION INTRAVENOUS at 12:42

## 2025-01-09 RX ADMIN — EPHEDRINE SULFATE 5 MG: 5 INJECTION INTRAVENOUS at 12:33

## 2025-01-09 RX ADMIN — PHENYLEPHRINE HYDROCHLORIDE 100 MCG: 0.1 INJECTION, SOLUTION INTRAVENOUS at 12:38

## 2025-01-09 RX ADMIN — CEFAZOLIN 2000 MG: 2 INJECTION, POWDER, FOR SOLUTION INTRAMUSCULAR; INTRAVENOUS at 12:12

## 2025-01-09 RX ADMIN — PHENYLEPHRINE HYDROCHLORIDE 100 MCG: 0.1 INJECTION, SOLUTION INTRAVENOUS at 12:39

## 2025-01-09 RX ADMIN — LIDOCAINE HYDROCHLORIDE 60 MG: 20 INJECTION, SOLUTION EPIDURAL; INFILTRATION; INTRACAUDAL; PERINEURAL at 12:03

## 2025-01-09 RX ADMIN — GLYCOPYRROLATE 0.1 MG: 0.2 INJECTION INTRAMUSCULAR; INTRAVENOUS at 12:11

## 2025-01-09 RX ADMIN — EPHEDRINE SULFATE 10 MG: 5 INJECTION INTRAVENOUS at 12:37

## 2025-01-09 RX ADMIN — PROPOFOL 50 MG: 10 INJECTION, EMULSION INTRAVENOUS at 12:04

## 2025-01-09 RX ADMIN — EPHEDRINE SULFATE 5 MG: 5 INJECTION INTRAVENOUS at 12:29

## 2025-01-09 RX ADMIN — ACETAMINOPHEN 1000 MG: 500 TABLET, FILM COATED ORAL at 11:39

## 2025-01-09 ASSESSMENT — PAIN - FUNCTIONAL ASSESSMENT
PAIN_FUNCTIONAL_ASSESSMENT: NONE - DENIES PAIN
PAIN_FUNCTIONAL_ASSESSMENT: 0-10

## 2025-01-09 NOTE — DISCHARGE INSTRUCTIONS
Remove outer dressing and gauze after 24 hours, OK to shower  ACTIVITY  As tolerated and as directed by your doctor.   Bathe or shower as directed by your doctor.     DIET  Clear liquids until no nausea or vomiting; then light diet for the first day.  Advance to regular diet on second day, unless your doctor orders otherwise.   If nausea and vomiting continues, call your doctor.     PAIN  Take pain medication as directed by your doctor.   Call your doctor if pain is NOT relieved by medication.   DO NOT take aspirin of blood thinners unless directed by your doctor.     MEDICATION INTERACTION:During your procedure you potentially received a medication or medications which may reduce the effectiveness of oral contraceptives. Please consider other forms of contraception for 1 month following your procedure if you are currently using oral contraceptives as your primary form of birth control. In addition to this, we recommend continuing your oral contraceptive as prescribed, unless otherwise instructed by your physician, during this time      CALL YOUR DOCTOR IF   Excessive bleeding that does not stop after holding pressure over the area  Temperature of 101 degrees F or above  Excessive redness, swelling or bruising, and/ or green or yellow, smelly discharge from incision    After general anesthesia or intravenous sedation, for 24 hours or while taking prescription Narcotics:  Limit your activities  A responsible adult needs to be with you for the next 24 hours  Do not drive and operate hazardous machinery  Do not make important personal or business decisions  Do not drink alcoholic beverages  If you have not urinated within 8 hours after discharge, and you are experiencing discomfort from urinary retention, please go to the nearest ED.  If you have sleep apnea and have a CPAP machine, please use it for all naps and sleeping.  Please use caution when taking narcotics and any of your home medications that may cause  drowsiness.  *  Please give a list of your current medications to your Primary Care Provider.  *  Please update this list whenever your medications are discontinued, doses are      changed, or new medications (including over-the-counter products) are added.  *  Please carry medication information at all times in case of emergency situations.    These are general instructions for a healthy lifestyle:  No smoking/ No tobacco products/ Avoid exposure to second hand smoke  Surgeon General's Warning:  Quitting smoking now greatly reduces serious risk to your health.  Obesity, smoking, and sedentary lifestyle greatly increases your risk for illness  A healthy diet, regular physical exercise & weight monitoring are important for maintaining a healthy lifestyle    You may be retaining fluid if you have a history of heart failure or if you experience any of the following symptoms:  Weight gain of 3 pounds or more overnight or 5 pounds in a week, increased swelling in our hands or feet or shortness of breath while lying flat in bed.  Please call your doctor as soon as you notice any of these symptoms; do not wait until your next office visit.

## 2025-01-09 NOTE — ANESTHESIA PRE PROCEDURE
BMI:   Wt Readings from Last 3 Encounters:   01/09/25 96.6 kg (213 lb)   12/10/24 97.5 kg (215 lb)   11/07/24 97.5 kg (215 lb)     Body mass index is 41.6 kg/m².    CBC:   Lab Results   Component Value Date/Time    WBC 8.2 11/07/2024 02:27 PM    RBC 4.98 11/07/2024 02:27 PM    HGB 14.0 11/07/2024 02:27 PM    HCT 42.9 11/07/2024 02:27 PM    MCV 86.1 11/07/2024 02:27 PM    RDW 12.9 11/07/2024 02:27 PM     11/07/2024 02:27 PM       CMP:   Lab Results   Component Value Date/Time     11/07/2024 02:27 PM    K 4.5 11/07/2024 02:27 PM     11/07/2024 02:27 PM    CO2 26 11/07/2024 02:27 PM    BUN 17 11/07/2024 02:27 PM    CREATININE 1.04 11/07/2024 02:27 PM    GFRAA >60 09/07/2022 08:23 AM    AGRATIO 1.6 02/24/2022 08:53 AM    LABGLOM 60 11/07/2024 02:27 PM    LABGLOM 56 10/30/2023 10:49 AM    GLUCOSE 79 11/07/2024 02:27 PM    CALCIUM 10.2 11/07/2024 02:27 PM    BILITOT 0.4 11/07/2024 02:27 PM    ALKPHOS 87 11/07/2024 02:27 PM    ALKPHOS 85 02/24/2022 08:53 AM    AST 27 11/07/2024 02:27 PM    ALT 23 11/07/2024 02:27 PM       POC Tests: No results for input(s): \"POCGLU\", \"POCNA\", \"POCK\", \"POCCL\", \"POCBUN\", \"POCHEMO\", \"POCHCT\" in the last 72 hours.    Coags: No results found for: \"PROTIME\", \"INR\", \"APTT\"    HCG (If Applicable): No results found for: \"PREGTESTUR\", \"PREGSERUM\", \"HCG\", \"HCGQUANT\"     ABGs: No results found for: \"PHART\", \"PO2ART\", \"CWV0WLW\", \"JXG1EVI\", \"BEART\", \"W0QYXOSE\"     Type & Screen (If Applicable):  Lab Results   Component Value Date    ABORH O POSITIVE 05/29/2019    LABANTI NEG 05/29/2019       Drug/Infectious Status (If Applicable):  Lab Results   Component Value Date/Time    HEPCAB <0.1 05/04/2018 08:09 AM       COVID-19 Screening (If Applicable): No results found for: \"COVID19\"        Anesthesia Evaluation  Patient summary reviewed and Nursing notes reviewed   history of anesthetic complications: PONV.  Airway: Mallampati: II  TM

## 2025-01-09 NOTE — OP NOTE
[address]  [address]                            OPERATIVE REPORT      PATIENT NAME: PRINCE SALAS       : 1960  MED REC NO: [id number]                       ROOM: [facility]  ACCOUNT NO: [id number]                       ADMIT DATE: 2025  PROVIDER: Genny Dey MD    DATE OF SERVICE:  2025    PREOPERATIVE DIAGNOSES:  Left breast cyst.    POSTOPERATIVE DIAGNOSES:  Left breast cyst.    PROCEDURES PERFORMED:  Left breast cyst excision for 1 cm.    SURGEON:  Genny Dey MD    ASSISTANT:  Saida    ANESTHESIA:  mac    ESTIMATED BLOOD LOSS:  Minimal.    SPECIMENS REMOVED:  as above    INTRAOPERATIVE FINDINGS:  The cyst is mainly within the skin.  I do feel some lumpy tissues under the skin.  Exploration was performed.  I see no definitive intra breast lesions.     COMPLICATIONS:  none    IMPLANTS:  none    INDICATIONS:  This is a 64-year-old female presenting with left breast cyst with history of infections.  Surgical excision is recommended to avoid future infection.  The patient understanding the risks and benefits, agreed to proceed.    DESCRIPTION OF PROCEDURE:  After the informed consent was obtained, the patient was brought into the operating room.  Monitored anesthesia care was administered.  Her left breast was prepped and draped in routine fashion.  I took an ellipse of skin incision to include the skin lesion and then dissection carried into the breast and careful exploration was performed, and with palpation and then surgical dissections, I see no definitive intra breast lesions and then the skin and the cyst was removed together with some underlying breast tissue.  Surgical field inspected.  Good hemostasis obtained.  The skin incision closed with interrupted 4-0 Vicryl stitch.  The patient tolerated the procedure well and transferred to the recovery room in stable condition.  All the instrument count and lap count were  correct.        ISRAEL WASHINGTON MD      BY/AQS  D:  01/09/2025 12:52:30  T:  01/09/2025 13:57:10  JOB #:  [id number]

## 2025-01-09 NOTE — ANESTHESIA POSTPROCEDURE EVALUATION
Department of Anesthesiology  Postprocedure Note    Patient: Serenity Ramirez  MRN: [id number]  YOB: 1960  Date of evaluation: 1/9/2025    Procedure Summary       Date: 01/09/25 Room / Location: [facility] MAIN OR 09 / [facility] MAIN OR    Anesthesia Start: 1154 Anesthesia Stop: 1255    Procedure: EXCISION BREAST CYST LEFT (Left: Breast) Diagnosis:       Breast cyst, left      (Breast cyst, left [N60.02])    Providers: Genny Dey MD Responsible Provider: Etienne Senior IV, MD    Anesthesia Type: TIVA ASA Status: 3            Anesthesia Type: TIVA    Judy Phase I: Judy Score: 10    Judy Phase II: Judy Score: 10    Anesthesia Post Evaluation    Patient location during evaluation: PACU  Patient participation: complete - patient participated  Level of consciousness: awake and alert  Airway patency: patent  Nausea & Vomiting: no nausea and no vomiting  Cardiovascular status: hemodynamically stable  Respiratory status: acceptable, nonlabored ventilation and spontaneous ventilation  Hydration status: euvolemic  Comments: BP (!) 104/57   Pulse 89   Temp 98.6 °F (37 °C) (Skin)   Resp 14   Ht 1.524 m (5')   Wt 96.6 kg (213 lb)   LMP  (LMP Unknown)   SpO2 99%   BMI 41.60 kg/m²     Multimodal analgesia pain management approach  Pain management: adequate and satisfactory to patient        No notable events documented.

## 2025-01-09 NOTE — BRIEF OP NOTE
Brief Postoperative Note      Patient: Serenity Ramirez  YOB: 1960  MRN: [id number]    Date of Procedure: 1/9/2025    Pre-Op Diagnosis Codes:      * Breast cyst, left [N60.02]    Post-Op Diagnosis: Same       Procedure(s):  EXCISION BREAST CYST LEFT 1 cm    Surgeon(s):  Genny Dey MD    Assistant:  Surgical Assistant: Saida Phelan    Anesthesia: Monitor Anesthesia Care    Estimated Blood Loss (mL): Minimal    Complications: None    Specimens:   ID Type Source Tests Collected by Time Destination   A : left breast cyst Tissue Breast SURGICAL PATHOLOGY Genny Dey MD 1/9/2025 1148        Implants:  * No implants in log *      Drains: * No LDAs found *    Findings:  Infection Present At Time Of Surgery (PATOS) (choose all levels that have infection present):  No infection present  Other Findings: no active infection    Electronically signed by Genny Dey MD on 1/9/2025 at 12:47 PM

## 2025-01-21 ENCOUNTER — OFFICE VISIT (OUTPATIENT)
Dept: SURGERY | Age: 65
End: 2025-01-21

## 2025-01-21 VITALS — HEIGHT: 60 IN | WEIGHT: 213 LBS | BODY MASS INDEX: 41.82 KG/M2

## 2025-01-21 DIAGNOSIS — Z48.89 AFTERCARE FOLLOWING SURGERY: Primary | ICD-10-CM

## 2025-01-21 PROCEDURE — 99024 POSTOP FOLLOW-UP VISIT: CPT | Performed by: SURGERY

## 2025-01-21 NOTE — PROGRESS NOTES
[facility]  [address] [place] [address], [address]  [place] [id number]  [phone]      SUBJECTIVE: Serenity Ramirez is a 64 y.o. female is seen for a routine postop check. She had a left breast cyst removed. Today, she reports no problems with the wound or other issues.  Activity, diet and bowels are normal. No pain.    OBJECTIVE: Appears well. Wound is well healed without complications or infection.    PATH:Skin, left breast, excision:   - Epidermoid (infundibular) cyst.     ASSESSMENT: normal postoperative course, doing well.    PLAN: Return PRN.    Genny Dey MD

## 2025-02-25 ENCOUNTER — TELEPHONE (OUTPATIENT)
Dept: INTERNAL MEDICINE CLINIC | Facility: CLINIC | Age: 65
End: 2025-02-25

## 2025-02-25 SDOH — ECONOMIC STABILITY: FOOD INSECURITY: WITHIN THE PAST 12 MONTHS, YOU WORRIED THAT YOUR FOOD WOULD RUN OUT BEFORE YOU GOT MONEY TO BUY MORE.: NEVER TRUE

## 2025-02-25 SDOH — ECONOMIC STABILITY: INCOME INSECURITY: IN THE LAST 12 MONTHS, WAS THERE A TIME WHEN YOU WERE NOT ABLE TO PAY THE MORTGAGE OR RENT ON TIME?: NO

## 2025-02-25 SDOH — ECONOMIC STABILITY: FOOD INSECURITY: WITHIN THE PAST 12 MONTHS, THE FOOD YOU BOUGHT JUST DIDN'T LAST AND YOU DIDN'T HAVE MONEY TO GET MORE.: NEVER TRUE

## 2025-02-25 SDOH — ECONOMIC STABILITY: TRANSPORTATION INSECURITY
IN THE PAST 12 MONTHS, HAS THE LACK OF TRANSPORTATION KEPT YOU FROM MEDICAL APPOINTMENTS OR FROM GETTING MEDICATIONS?: NO

## 2025-02-25 ASSESSMENT — PATIENT HEALTH QUESTIONNAIRE - PHQ9
1. LITTLE INTEREST OR PLEASURE IN DOING THINGS: NOT AT ALL
SUM OF ALL RESPONSES TO PHQ QUESTIONS 1-9: 0
2. FEELING DOWN, DEPRESSED OR HOPELESS: NOT AT ALL
SUM OF ALL RESPONSES TO PHQ QUESTIONS 1-9: 0
SUM OF ALL RESPONSES TO PHQ9 QUESTIONS 1 & 2: 0
SUM OF ALL RESPONSES TO PHQ QUESTIONS 1-9: 0
2. FEELING DOWN, DEPRESSED OR HOPELESS: NOT AT ALL
1. LITTLE INTEREST OR PLEASURE IN DOING THINGS: NOT AT ALL
SUM OF ALL RESPONSES TO PHQ9 QUESTIONS 1 & 2: 0
SUM OF ALL RESPONSES TO PHQ QUESTIONS 1-9: 0

## 2025-02-25 NOTE — TELEPHONE ENCOUNTER
JONNA.   Worked pt on to be seen tomorrow @ 2:45 pm.   Pt is concerned that she has shingles on her face. She currently does not have a rash or blisters just the tingling nerve pain on her face. She was worried because the pain in around her eye.   She has had cold symptoms x 2 weeks and cold sores in her nose.     She had shingles in the past in the same exact area.

## 2025-02-25 NOTE — TELEPHONE ENCOUNTER
Pt stated she feels as if she has shingles again. This time around her temple, high cheekbones and in her eye. Her eye is itching and burning. Pt has an appt with Kayley Monday at 11:40 but was wondering if something could be called in for the virus ahead of time. She is afraid the shingles will damage her eyes. Advised her to go to  if she feels worse.

## 2025-02-26 ENCOUNTER — OFFICE VISIT (OUTPATIENT)
Dept: INTERNAL MEDICINE CLINIC | Facility: CLINIC | Age: 65
End: 2025-02-26
Payer: COMMERCIAL

## 2025-02-26 VITALS
TEMPERATURE: 97 F | HEIGHT: 60 IN | SYSTOLIC BLOOD PRESSURE: 130 MMHG | OXYGEN SATURATION: 97 % | DIASTOLIC BLOOD PRESSURE: 73 MMHG | BODY MASS INDEX: 42.21 KG/M2 | HEART RATE: 77 BPM | WEIGHT: 215 LBS

## 2025-02-26 DIAGNOSIS — J34.89 NASAL SORE: ICD-10-CM

## 2025-02-26 DIAGNOSIS — R76.8 ELEVATED ANTINUCLEAR ANTIBODY (ANA) LEVEL: ICD-10-CM

## 2025-02-26 DIAGNOSIS — B00.9 HERPES SIMPLEX: ICD-10-CM

## 2025-02-26 DIAGNOSIS — R21 RASH: Primary | ICD-10-CM

## 2025-02-26 PROCEDURE — 1123F ACP DISCUSS/DSCN MKR DOCD: CPT | Performed by: INTERNAL MEDICINE

## 2025-02-26 PROCEDURE — 99214 OFFICE O/P EST MOD 30 MIN: CPT | Performed by: INTERNAL MEDICINE

## 2025-02-26 RX ORDER — VALACYCLOVIR HYDROCHLORIDE 1 G/1
2000 TABLET, FILM COATED ORAL 2 TIMES DAILY
Qty: 30 TABLET | Refills: 1 | Status: SHIPPED | OUTPATIENT
Start: 2025-02-26 | End: 2025-03-13

## 2025-02-26 RX ORDER — MUPIROCIN 20 MG/G
OINTMENT TOPICAL
Qty: 30 G | Status: SHIPPED | OUTPATIENT
Start: 2025-02-26

## 2025-02-26 SDOH — ECONOMIC STABILITY: FOOD INSECURITY: WITHIN THE PAST 12 MONTHS, THE FOOD YOU BOUGHT JUST DIDN'T LAST AND YOU DIDN'T HAVE MONEY TO GET MORE.: NEVER TRUE

## 2025-02-26 SDOH — ECONOMIC STABILITY: FOOD INSECURITY: WITHIN THE PAST 12 MONTHS, YOU WORRIED THAT YOUR FOOD WOULD RUN OUT BEFORE YOU GOT MONEY TO BUY MORE.: NEVER TRUE

## 2025-02-26 ASSESSMENT — ENCOUNTER SYMPTOMS: PHOTOPHOBIA: 0

## 2025-02-26 NOTE — PROGRESS NOTES
2/26/2025 5:46 PM  Location:[facility] PHYSICIAN SERVICES  [facility] [place]NAL MEDICINE  [place]  Patient #:  [id number]  YOB: 1960            History of Present Illness     Chief Complaint   Patient presents with    Facial Pain     Internal, 3 days       Ms. Ramirez is a 64 y.o. female  who presents for the above.   Got sick at the end of January.  Always gets fever blisters when she has viral illness.  Had some inside of her nose and now has sensitivity to touch on her right eyelid and right face.  Notes that her eye was watering as well.             Allergies   Allergen Reactions    Sulfa Antibiotics      Other Reaction(s): Unknown-Unspecified    Meperidine Nausea And Vomiting     Past Medical History:   Diagnosis Date    Diaphragmatic hernia without mention of obstruction or gangrene 6/2/2015    Encounter for long-term (current) use of other medications     Helminth infection, unspecified     Nontoxic uninodular goiter 6/2/2015    pt denies this dx    Other specified erythematous condition(695.89)     Perforation of colon (HCC) 2018    R/T colonscopy--required surg    PMB (postmenopausal bleeding)     PONV (postoperative nausea and vomiting)     per pt \" most\" of the time     Social History     Socioeconomic History    Marital status: Single     Spouse name: None    Number of children: None    Years of education: None    Highest education level: None   Tobacco Use    Smoking status: Never     Passive exposure: Past    Smokeless tobacco: Never   Vaping Use    Vaping status: Never Used   Substance and Sexual Activity    Alcohol use: Yes     Comment: RARELY    Drug use: No     Social Determinants of Health     Financial Resource Strain: Low Risk  (11/6/2024)    Overall Financial Resource Strain (CARDIA)     Difficulty of Paying Living Expenses: Not very hard   Food Insecurity: No Food Insecurity (2/26/2025)    Hunger Vital Sign     Worried About Running Out of Food in the Last Year: Never true

## 2025-04-15 ENCOUNTER — OFFICE VISIT (OUTPATIENT)
Dept: INTERNAL MEDICINE CLINIC | Facility: CLINIC | Age: 65
End: 2025-04-15
Payer: COMMERCIAL

## 2025-04-15 VITALS
HEART RATE: 69 BPM | WEIGHT: 210 LBS | DIASTOLIC BLOOD PRESSURE: 71 MMHG | SYSTOLIC BLOOD PRESSURE: 139 MMHG | HEIGHT: 60 IN | BODY MASS INDEX: 41.23 KG/M2 | RESPIRATION RATE: 18 BRPM

## 2025-04-15 DIAGNOSIS — E78.00 PURE HYPERCHOLESTEROLEMIA: Primary | ICD-10-CM

## 2025-04-15 DIAGNOSIS — Z13.820 SCREENING FOR OSTEOPOROSIS: ICD-10-CM

## 2025-04-15 DIAGNOSIS — E66.813 OBESITY, CLASS 3: ICD-10-CM

## 2025-04-15 DIAGNOSIS — N39.46 MIXED STRESS AND URGE URINARY INCONTINENCE: ICD-10-CM

## 2025-04-15 DIAGNOSIS — E78.00 PURE HYPERCHOLESTEROLEMIA: ICD-10-CM

## 2025-04-15 DIAGNOSIS — L90.0 LICHEN SCLEROSUS ET ATROPHICUS: ICD-10-CM

## 2025-04-15 DIAGNOSIS — N81.10 VAGINAL WALL PROLAPSE: ICD-10-CM

## 2025-04-15 DIAGNOSIS — Z12.31 VISIT FOR SCREENING MAMMOGRAM: ICD-10-CM

## 2025-04-15 DIAGNOSIS — Z78.0 MENOPAUSE: ICD-10-CM

## 2025-04-15 DIAGNOSIS — K57.30 DIVERTICULOSIS OF LARGE INTESTINE WITHOUT HEMORRHAGE: ICD-10-CM

## 2025-04-15 DIAGNOSIS — E04.1 NONTOXIC UNINODULAR GOITER: ICD-10-CM

## 2025-04-15 DIAGNOSIS — Z00.00 ENCOUNTER FOR WELL ADULT EXAM WITHOUT ABNORMAL FINDINGS: ICD-10-CM

## 2025-04-15 DIAGNOSIS — R20.0 NUMBNESS IN RIGHT LEG: ICD-10-CM

## 2025-04-15 LAB
ALBUMIN SERPL-MCNC: 4 G/DL (ref 3.2–4.6)
ALBUMIN/GLOB SERPL: 1.1 (ref 1–1.9)
ALP SERPL-CCNC: 78 U/L (ref 35–104)
ALT SERPL-CCNC: 15 U/L (ref 8–45)
ANION GAP SERPL CALC-SCNC: 12 MMOL/L (ref 7–16)
AST SERPL-CCNC: 27 U/L (ref 15–37)
BASOPHILS # BLD: 0.05 K/UL (ref 0–0.2)
BASOPHILS NFR BLD: 0.6 % (ref 0–2)
BILIRUB SERPL-MCNC: 0.4 MG/DL (ref 0–1.2)
BUN SERPL-MCNC: 16 MG/DL (ref 8–23)
CALCIUM SERPL-MCNC: 10.1 MG/DL (ref 8.8–10.2)
CHLORIDE SERPL-SCNC: 102 MMOL/L (ref 98–107)
CHOLEST SERPL-MCNC: 184 MG/DL (ref 0–200)
CO2 SERPL-SCNC: 26 MMOL/L (ref 20–29)
CREAT SERPL-MCNC: 1.09 MG/DL (ref 0.6–1.1)
CRP SERPL-MCNC: 0.9 MG/DL (ref 0–0.4)
DIFFERENTIAL METHOD BLD: NORMAL
EOSINOPHIL # BLD: 0.27 K/UL (ref 0–0.8)
EOSINOPHIL NFR BLD: 3.3 % (ref 0.5–7.8)
ERYTHROCYTE [DISTWIDTH] IN BLOOD BY AUTOMATED COUNT: 13.2 % (ref 11.9–14.6)
GLOBULIN SER CALC-MCNC: 3.5 G/DL (ref 2.3–3.5)
GLUCOSE SERPL-MCNC: 87 MG/DL (ref 70–99)
HCT VFR BLD AUTO: 40.7 % (ref 35.8–46.3)
HDLC SERPL-MCNC: 58 MG/DL (ref 40–60)
HDLC SERPL: 3.2 (ref 0–5)
HGB BLD-MCNC: 13.8 G/DL (ref 11.7–15.4)
IMM GRANULOCYTES # BLD AUTO: 0.02 K/UL (ref 0–0.5)
IMM GRANULOCYTES NFR BLD AUTO: 0.2 % (ref 0–5)
LDLC SERPL CALC-MCNC: 112 MG/DL (ref 0–100)
LYMPHOCYTES # BLD: 2.58 K/UL (ref 0.5–4.6)
LYMPHOCYTES NFR BLD: 31.8 % (ref 13–44)
MCH RBC QN AUTO: 28.1 PG (ref 26.1–32.9)
MCHC RBC AUTO-ENTMCNC: 33.9 G/DL (ref 31.4–35)
MCV RBC AUTO: 82.9 FL (ref 82–102)
MONOCYTES # BLD: 0.56 K/UL (ref 0.1–1.3)
MONOCYTES NFR BLD: 6.9 % (ref 4–12)
NEUTS SEG # BLD: 4.64 K/UL (ref 1.7–8.2)
NEUTS SEG NFR BLD: 57.2 % (ref 43–78)
NRBC # BLD: 0 K/UL (ref 0–0.2)
PLATELET # BLD AUTO: 264 K/UL (ref 150–450)
PMV BLD AUTO: 11.2 FL (ref 9.4–12.3)
POTASSIUM SERPL-SCNC: 4.1 MMOL/L (ref 3.5–5.1)
PROT SERPL-MCNC: 7.5 G/DL (ref 6.3–8.2)
RBC # BLD AUTO: 4.91 M/UL (ref 4.05–5.2)
SODIUM SERPL-SCNC: 140 MMOL/L (ref 136–145)
TRIGL SERPL-MCNC: 72 MG/DL (ref 0–150)
TSH W FREE THYROID IF ABNORMAL: 1.19 UIU/ML (ref 0.27–4.2)
VLDLC SERPL CALC-MCNC: 14 MG/DL (ref 6–23)
WBC # BLD AUTO: 8.1 K/UL (ref 4.3–11.1)

## 2025-04-15 PROCEDURE — 99214 OFFICE O/P EST MOD 30 MIN: CPT | Performed by: INTERNAL MEDICINE

## 2025-04-15 PROCEDURE — 99397 PER PM REEVAL EST PAT 65+ YR: CPT | Performed by: INTERNAL MEDICINE

## 2025-04-15 RX ORDER — ESTRADIOL 10 UG/1
10 TABLET, FILM COATED VAGINAL
Qty: 24 TABLET | Refills: 3 | Status: SHIPPED | OUTPATIENT
Start: 2025-04-16

## 2025-04-15 RX ORDER — CLOBETASOL PROPIONATE 0.5 MG/G
OINTMENT TOPICAL
Qty: 30 G | Refills: 1 | Status: SHIPPED | OUTPATIENT
Start: 2025-04-15

## 2025-04-15 ASSESSMENT — ENCOUNTER SYMPTOMS
CONSTIPATION: 0
NAUSEA: 0
COUGH: 0
BLOOD IN STOOL: 0
WHEEZING: 0
SHORTNESS OF BREATH: 0
VOMITING: 0
BACK PAIN: 0
DIARRHEA: 0

## 2025-04-15 NOTE — PATIENT INSTRUCTIONS
Well Visit, Over 65: Care Instructions  Well visits can help you stay healthy. Your doctor has checked your overall health and may have suggested ways to take good care of yourself. Your doctor also may have recommended tests. You can help prevent illness with healthy eating, good sleep, vaccinations, regular exercise, and other steps.    Get the tests that you and your doctor decide on. Depending on your age and risks, examples might include hearing tests as well as screening for colon, breast, and lung cancer. Screening helps find diseases before any symptoms appear.   Eat healthy foods. Choose fruits, vegetables, whole grains, lean protein, and low-fat dairy foods. Limit saturated fat, and reduce salt.     Limit alcohol. Men should have no more than 2 drinks a day. Women should have no more than 1. For some people, no alcohol is the best choice.   Exercise. It can help prevent falls. Get at least 30 minutes of exercise on most days of the week. Walking, yoga, and mustapha chi can be good choices.     Reach and stay at your healthy weight. This will lower your risk for many health problems.   Take care of your mental health. Try to stay connected with friends, family, and community, and find ways to manage stress.     If you're feeling depressed or hopeless, talk to someone. A counselor can help. If you don't have a counselor, talk to your doctor.   Talk to your doctor if you think you may have a problem with alcohol or drug use. This includes prescription medicines and illegal drugs.     Avoid tobacco and nicotine: Don't smoke, vape, or chew. If you need help quitting, talk to your doctor.   Practice safer sex. Getting tested, using condoms or dental dams, and limiting sex partners can help prevent STIs.     Make an advance directive. This is a legal way to tell your family and doctor what you want to happen at the end of your life or when you can't speak for yourself.   Prevent problems where you can. Protect

## 2025-04-15 NOTE — PROGRESS NOTES
4/15/2025 3:49 PM  Location:[facility], [place] PHYSICIAN SERVICES  [facility] INTERNAL MEDICINE  [place]  Patient #:  [id number]  YOB: 1960            History of Present Illness     Chief Complaint   Patient presents with    Annual Exam     Patient here for her yearly exam. Fasting       Leg Pain     Complains with episodes of right pain.      Incontinence     Having trouble with some incontinence.        Ms. Ramirez is a 64 y.o. female  who presents for the above.   Has nerve pain in her right lateral lower leg.  No swelling in her knee.  No low back pain.  Trying to walk daily but leg is interfering with this.  Is here for yearly preventive exam as well as follow up on chronic medical issues.            Allergies   Allergen Reactions    Sulfa Antibiotics      Other Reaction(s): Unknown-Unspecified    Meperidine Nausea And Vomiting     Past Medical History:   Diagnosis Date    Diaphragmatic hernia without mention of obstruction or gangrene 6/2/2015    Encounter for long-term (current) use of other medications     Helminth infection, unspecified     Nontoxic uninodular goiter 6/2/2015    pt denies this dx    Other specified erythematous condition(695.89)     Perforation of colon (HCC) 2018    R/T colonscopy--required surg    PMB (postmenopausal bleeding)     PONV (postoperative nausea and vomiting)     per pt \" most\" of the time     Social History     Socioeconomic History    Marital status: Single     Spouse name: None    Number of children: None    Years of education: None    Highest education level: None   Tobacco Use    Smoking status: Never     Passive exposure: Past    Smokeless tobacco: Never   Vaping Use    Vaping status: Never Used   Substance and Sexual Activity    Alcohol use: Yes     Comment: RARELY    Drug use: No     Social Drivers of Health     Financial Resource Strain: Low Risk  (11/6/2024)    Overall Financial Resource Strain (CARDIA)     Difficulty of Paying Living Expenses: Not very hard

## 2025-04-17 ENCOUNTER — CLINICAL DOCUMENTATION (OUTPATIENT)
Dept: INTERNAL MEDICINE CLINIC | Facility: CLINIC | Age: 65
End: 2025-04-17

## 2025-04-17 ENCOUNTER — RESULTS FOLLOW-UP (OUTPATIENT)
Dept: INTERNAL MEDICINE CLINIC | Facility: CLINIC | Age: 65
End: 2025-04-17

## 2025-04-17 DIAGNOSIS — R20.0 NUMBNESS IN RIGHT LEG: Primary | ICD-10-CM

## 2025-04-17 DIAGNOSIS — M47.816 LUMBAR FACET ARTHROPATHY: ICD-10-CM

## 2025-04-17 NOTE — RESULT ENCOUNTER NOTE
Cholesterol is better.  Measure of inflammation has improved.  Continue your current doses of medications. Keep up the good work.  Thanks.  [facility]

## 2025-05-02 ENCOUNTER — HOSPITAL ENCOUNTER (OUTPATIENT)
Dept: GENERAL RADIOLOGY | Age: 65
Discharge: HOME | End: 2025-05-02
Payer: COMMERCIAL

## 2025-05-02 DIAGNOSIS — R20.0 NUMBNESS IN RIGHT LEG: ICD-10-CM

## 2025-05-02 PROCEDURE — 72100 X-RAY EXAM L-S SPINE 2/3 VWS: CPT

## 2025-05-07 ENCOUNTER — OFFICE VISIT (OUTPATIENT)
Age: 65
End: 2025-05-07
Payer: COMMERCIAL

## 2025-05-07 DIAGNOSIS — M25.561 RIGHT KNEE PAIN, UNSPECIFIED CHRONICITY: Primary | ICD-10-CM

## 2025-05-07 DIAGNOSIS — M17.0 PRIMARY OSTEOARTHRITIS OF BOTH KNEES: ICD-10-CM

## 2025-05-07 DIAGNOSIS — M54.16 LUMBAR RADICULOPATHY: ICD-10-CM

## 2025-05-07 DIAGNOSIS — M43.16 SPONDYLOLISTHESIS OF LUMBAR REGION: ICD-10-CM

## 2025-05-07 PROCEDURE — 99204 OFFICE O/P NEW MOD 45 MIN: CPT | Performed by: NURSE PRACTITIONER

## 2025-05-07 PROCEDURE — 1123F ACP DISCUSS/DSCN MKR DOCD: CPT | Performed by: NURSE PRACTITIONER

## 2025-05-07 RX ORDER — MELOXICAM 15 MG/1
15 TABLET ORAL DAILY PRN
Qty: 30 TABLET | Refills: 0 | Status: SHIPPED | OUTPATIENT
Start: 2025-05-07

## 2025-05-07 RX ORDER — METHYLPREDNISOLONE 4 MG/1
TABLET ORAL
Qty: 1 KIT | Refills: 0 | Status: SHIPPED | OUTPATIENT
Start: 2025-05-07

## 2025-05-07 NOTE — PROGRESS NOTES
doubt that there is impingement of this nerve but she also has knee complaints and limited range of motion of her knee consistent with knee OA as evidenced on x-rays.  Will go ahead and start conservative treatment for her lumbar spine.  We can get her set up with one of the joint providers for her knee pain complaints.  I will see her back in 6 weeks if she still having lower leg pain we will look at pursuing an MRI of the lumbar spine     we discussed the natural history of lumbar radiculopathy in that many of these patients have near complete resolution of their symptoms within eight to twelve weeks with conservative care. We discussed that conservative treatments typically start with activity modification, and medication followed by physical therapy as symptoms allow.  Oral and/or epidural steroids are other options. I also discussed potential surgical options if the symptoms fail to improve or there is a progressive neurologic deficit and conservative management has been exhausted.  We discussed that surgery is not typically a reliable treatment for isolated back pain, but is usually very reliable in relieving buttock and leg symptoms.        - A home exercise program was prescribed for stretching and strengthening. A list of exercises was provided.   - If the patient fails to respond to these efforts, I would recommend MRI of the lumbar spine for further evaluation.  - NSAID: The patient is agreeable to a trial of nonsteroidal anti-inflammatory drugs (NSAIDs). We discussed risks associated with their use, including GI tract or other bleeding, and also some cardiac risk. Instructions were given to discontinue the NSAID if there is any sign of GI bleed, chest pain, or shortness of breath.  Continued use of NSAIDS is recommended to be managed by PCP to monitor kidney and liver function.  - Oral Steroids: A short course of oral steroids was prescribed in an attempt to bring the acute radicular symptoms under

## (undated) DEVICE — KENDALL SCD EXPRESS SLEEVES, KNEE LENGTH, MEDIUM: Brand: KENDALL SCD

## (undated) DEVICE — SPONGE: SPECIALTY PEANUT XR 100/CS: Brand: MEDICAL ACTION INDUSTRIES

## (undated) DEVICE — STAPLER INT AD L31MM DIA5MM STD GI GRN TI CIR CUT 2 ROW

## (undated) DEVICE — TRAY CATH 16F URIN MTR LTX -- CONVERT TO ITEM 363111

## (undated) DEVICE — SUTURE VCRL SZ 0 L54IN ABSRB UD POLYGLACTIN 910 COAT BRAID J608H

## (undated) DEVICE — GLOVE ORANGE PI 7 1/2   MSG9075

## (undated) DEVICE — GDWIRE 3CM FLX-TIP 0.038X150CM -- BX/5 SENSOR

## (undated) DEVICE — BUTTON SWITCH PENCIL BLADE ELECTRODE, HOLSTER: Brand: EDGE

## (undated) DEVICE — JELLY LUBRICATING 10GM PREFIL SYR LUBE

## (undated) DEVICE — SUTURE ABSORBABLE BRAIDED 0 CT-1 8X27 IN UD VICRYL JJ41G

## (undated) DEVICE — GLOVE SURG SZ 6.5 L11.2IN THK8.6MIL LT BRN LTX FREE

## (undated) DEVICE — SOLUTION IRRIG 3000ML H2O STRL BAG

## (undated) DEVICE — MEDI-VAC YANKAUER SUCTION HANDLE W/BULBOUS TIP: Brand: CARDINAL HEALTH

## (undated) DEVICE — SOLUTION IV 1000ML 0.9% SOD CHL

## (undated) DEVICE — SUTURE VICRYL + SZ 4-0 L27IN ABSRB UD PS-2 3/8 CIR REV CUT VCP426H

## (undated) DEVICE — SOLUTION IRRIG 1000ML H2O STRL BLT

## (undated) DEVICE — MINOR SPLIT GENERAL: Brand: MEDLINE INDUSTRIES, INC.

## (undated) DEVICE — GLOVE SURG SZ 65 THK91MIL LTX FREE SYN POLYISOPRENE

## (undated) DEVICE — STRIP,CLOSURE,WOUND,MEDI-STRIP,1/2X4: Brand: MEDLINE

## (undated) DEVICE — DRAPE TWL SURG 16X26IN BLU ORB04] ALLCARE INC]

## (undated) DEVICE — CATH URET 5FRX70CM W/OPN END -- BX/20

## (undated) DEVICE — CURVED, LARGE JAW, OPEN SEALER/DIVIDER NANO-COATED: Brand: LIGASURE IMPACT

## (undated) DEVICE — SUTURE PERMAHAND SZ 3-0 L30IN NONABSORBABLE BLK L26MM SH C017D

## (undated) DEVICE — SUTURE MONOCRYL SZ 4-0 L27IN ABSRB UD L19MM PS-2 1/2 CIR PRIM Y426H

## (undated) DEVICE — SOLUTION IRRIG 1000ML 0.9% SOD CHL USP POUR PLAS BTL

## (undated) DEVICE — DRAIN SURG W10MMXL20CM SIL FLAT HUBLESS W/ RADPQ STRP 3/4

## (undated) DEVICE — SHEET, T, LAPAROTOMY, STERILE: Brand: MEDLINE

## (undated) DEVICE — MASTISOL ADHESIVE LIQ 2/3ML

## (undated) DEVICE — CYSTO: Brand: MEDLINE INDUSTRIES, INC.

## (undated) DEVICE — GOWN,PREVENTION PLUS,2XL,ST,22/CS: Brand: MEDLINE

## (undated) DEVICE — PURSE STRING DEVICE: Brand: PURSTRING

## (undated) DEVICE — LEGGINGS, PAIR, 31X48, STERILE: Brand: MEDLINE

## (undated) DEVICE — SPONGE LAP 18X18IN STRL -- 5/PK

## (undated) DEVICE — TRAY CATH 16F DRN BG LTX -- CONVERT TO ITEM 363158

## (undated) DEVICE — STAPLER INT STPL LN H1.8-4.8XL60MM THCK TISS 2 ROW 8 FIRING

## (undated) DEVICE — DRAPE,UNDERBUTTOCKS,PCH,STERILE: Brand: MEDLINE

## (undated) DEVICE — GAUZE,SPONGE,4"X4",16PLY,STRL,LF,10/TRAY: Brand: MEDLINE

## (undated) DEVICE — SUTURE PERMAHAND SZ 2-0 L12X18IN NONABSORBABLE BLK SILK A185H

## (undated) DEVICE — CYSTO/BLADDER IRRIGATION SET, REGULATING CLAMP

## (undated) DEVICE — SCOPE RECT LED W INSUFFLATOR

## (undated) DEVICE — SURGICAL PROCEDURE PACK BASIC ST FRANCIS

## (undated) DEVICE — RELOAD STPL L75MM OPN H3.8MM CLS 1.5MM WIRE DIA0.2MM REG

## (undated) DEVICE — Z DUPLICATE USE 2716240 STAPLER INT CUT LN L40MM STPL L51MM GRN CRV HD B FRM

## (undated) DEVICE — Z DISCONTINUED USE 2220129 SUTURE VCRL SZ 3-0 L18IN ABSRB UD W/O NDL POLYGLACTIN 910 J110T

## (undated) DEVICE — SUTURE PDS II SZ 1 L96IN ABSRB VLT TP-1 L65MM 1/2 CIR Z880G

## (undated) DEVICE — SUTURE PERMAHAND SZ 0 L30IN NONABSORBABLE BLK SILK BRAID A306H

## (undated) DEVICE — Device

## (undated) DEVICE — BASIC SINGLE BASIN-LF: Brand: MEDLINE INDUSTRIES, INC.

## (undated) DEVICE — REM POLYHESIVE ADULT PATIENT RETURN ELECTRODE: Brand: VALLEYLAB

## (undated) DEVICE — SLIM BODY SKIN STAPLER: Brand: APPOSE ULC

## (undated) DEVICE — BLADE ELECTRODE: Brand: EDGE

## (undated) DEVICE — STAPLER INT L75MM CUT LN L73MM STPL LN L77MM BLU B FRM 8

## (undated) DEVICE — SUTURE VCRL SZ 2-0 L27IN ABSRB UD L26MM SH 1/2 CIR J417H

## (undated) DEVICE — GOWN,REINF,POLY,ECL,PP SLV,XL: Brand: MEDLINE

## (undated) DEVICE — SKIN PREP TRAY 4 COMPARTM TRAY: Brand: MEDLINE INDUSTRIES, INC.

## (undated) DEVICE — (D)PREP SKN CHLRAPRP APPL 26ML -- CONVERT TO ITEM 371833

## (undated) DEVICE — TRAY PREP DRY W/ PREM GLV 2 APPL 6 SPNG 2 UNDPD 1 OVERWRAP